# Patient Record
Sex: MALE | Race: WHITE | NOT HISPANIC OR LATINO | Employment: FULL TIME | URBAN - METROPOLITAN AREA
[De-identification: names, ages, dates, MRNs, and addresses within clinical notes are randomized per-mention and may not be internally consistent; named-entity substitution may affect disease eponyms.]

---

## 2021-02-11 DIAGNOSIS — Z23 ENCOUNTER FOR IMMUNIZATION: ICD-10-CM

## 2021-02-19 ENCOUNTER — IMMUNIZATIONS (OUTPATIENT)
Dept: FAMILY MEDICINE CLINIC | Facility: HOSPITAL | Age: 31
End: 2021-02-19

## 2021-02-19 DIAGNOSIS — Z23 ENCOUNTER FOR IMMUNIZATION: Primary | ICD-10-CM

## 2021-02-19 PROCEDURE — 91300 SARS-COV-2 / COVID-19 MRNA VACCINE (PFIZER-BIONTECH) 30 MCG: CPT

## 2021-02-19 PROCEDURE — 0001A SARS-COV-2 / COVID-19 MRNA VACCINE (PFIZER-BIONTECH) 30 MCG: CPT

## 2021-03-12 ENCOUNTER — IMMUNIZATIONS (OUTPATIENT)
Dept: FAMILY MEDICINE CLINIC | Facility: HOSPITAL | Age: 31
End: 2021-03-12

## 2021-03-12 DIAGNOSIS — Z23 ENCOUNTER FOR IMMUNIZATION: Primary | ICD-10-CM

## 2021-03-12 PROCEDURE — 91300 SARS-COV-2 / COVID-19 MRNA VACCINE (PFIZER-BIONTECH) 30 MCG: CPT

## 2021-03-12 PROCEDURE — 0002A SARS-COV-2 / COVID-19 MRNA VACCINE (PFIZER-BIONTECH) 30 MCG: CPT

## 2022-02-23 ENCOUNTER — OFFICE VISIT (OUTPATIENT)
Dept: FAMILY MEDICINE CLINIC | Facility: CLINIC | Age: 32
End: 2022-02-23
Payer: COMMERCIAL

## 2022-02-23 VITALS
RESPIRATION RATE: 17 BRPM | DIASTOLIC BLOOD PRESSURE: 64 MMHG | OXYGEN SATURATION: 98 % | BODY MASS INDEX: 20.13 KG/M2 | HEIGHT: 78 IN | SYSTOLIC BLOOD PRESSURE: 122 MMHG | HEART RATE: 84 BPM | WEIGHT: 174 LBS | TEMPERATURE: 98 F

## 2022-02-23 DIAGNOSIS — Z13.6 SCREENING FOR CARDIOVASCULAR CONDITION: ICD-10-CM

## 2022-02-23 DIAGNOSIS — F90.2 ATTENTION DEFICIT HYPERACTIVITY DISORDER (ADHD), COMBINED TYPE: ICD-10-CM

## 2022-02-23 DIAGNOSIS — F31.81 BIPOLAR 2 DISORDER (HCC): ICD-10-CM

## 2022-02-23 DIAGNOSIS — L30.8 OTHER ECZEMA: ICD-10-CM

## 2022-02-23 DIAGNOSIS — Z13.29 SCREENING FOR THYROID DISORDER: ICD-10-CM

## 2022-02-23 DIAGNOSIS — Z00.00 ANNUAL PHYSICAL EXAM: Primary | ICD-10-CM

## 2022-02-23 DIAGNOSIS — R53.83 FATIGUE, UNSPECIFIED TYPE: ICD-10-CM

## 2022-02-23 PROBLEM — L30.9 ECZEMA: Status: ACTIVE | Noted: 2022-02-23

## 2022-02-23 PROCEDURE — 99385 PREV VISIT NEW AGE 18-39: CPT | Performed by: FAMILY MEDICINE

## 2022-02-23 PROCEDURE — 99203 OFFICE O/P NEW LOW 30 MIN: CPT | Performed by: FAMILY MEDICINE

## 2022-02-23 NOTE — PROGRESS NOTES
Assessment/Plan:    Eczema  Patient has dermatitis likely eczema finger tips  Recently over the past several weeks  Advised patient continue with Eucerin or moisturizer lotion  Continue wearing gloves as needed  Try to avoid washing his hands often or if so patient needs to moisturize hands  Will refer to Dermatology  Follow-up as needed    Bipolar 2 disorder [facility])  Patient has a bipolar disorder type 2  Needs referral to behavioral health therapist   Will refer today  Also refer to psychiatrist as needed  No medication needed at this time  Advised patient that if he does feel like he needs medication any time, he should come to office or go to psychiatrist   Follow-up as needed    Attention deficit hyperactivity disorder (ADHD), combined type  Not currently on medications  Manage mostly with behavioral and lifestyle changes  Follow-up as needed       Diagnoses and all orders for this visit:    Annual physical exam    Other eczema  -     Ambulatory Referral to Dermatology; Future    Bipolar 2 disorder [facility])  -     Ambulatory Referral to Psychiatry; Future  -     Ambulatory Referral to [facility]; Future    Attention deficit hyperactivity disorder (ADHD), combined type  -     Ambulatory Referral to Psychiatry; Future  -     Ambulatory Referral to [facility]; Future    Screening for cardiovascular condition  Comments:  Ordered CMP and lipid panel  Orders:  -     Comprehensive metabolic panel; Future  -     Lipid panel; Future    Screening for thyroid disorder  Comments:  Order TSH  Orders:  -     TSH, 3rd generation with Free T4 reflex;  Future    Fatigue, unspecified type  Comments:  ordered CBC  Orders:  -     CBC and differential; Future          Subjective:   Chief Complaint   Patient presents with   [address] Maintenance   Topic Date Due    Hepatitis C Screening  Never done    HIV Screening  Never done    DTaP,Tdap,and Td Vaccines (1 - Tdap) Never done    COVID-19 Vaccine (3 - Booster for Pfizer series) 08/12/2021    Influenza Vaccine (1) Never done    Depression Screening  02/23/2023    BMI: Adult  02/23/2023    Annual Physical  02/23/2023    Pneumococcal Vaccine: Pediatrics (0 to 5 Years) and At-Risk Patients (6 to 59 Years)  Aged Out    HIB Vaccine  Aged Out    Hepatitis B Vaccine  Aged Out    IPV Vaccine  Aged Out    Hepatitis A Vaccine  Aged Out    Meningococcal ACWY Vaccine  Aged Out    HPV Vaccine  Aged Out        Patient ID: Betty Hall is a [id number] [address] y o  male  HPI    Rash occurring on all fingertips of both hands that started roughly 3 and half weeks ago  Itchy and dry  Has tried using moisturizing cream/Eucerin/Aquaphor eczema cream with cotton gloves  Improvement with this treatment  Has strong family history of eczema  No personal history in the past     On history of bipolar disorder type 2 and ADHD  Patient has history of both diagnosis  Diagnosis a child  Previously on different medications  Of note patient recalls being on Depakote for bipolar disorder  Patient also recalls being on Paxil and Ritalin  Off all these medications now  Has been managed with mostly behavioral changes in lifestyle changes  On counseling at this time  The following portions of the patient's history were reviewed and updated as appropriate: allergies, current medications, past family history, past medical history, past social history, past surgical history, and problem list     Review of Systems   Constitutional: Negative for chills and fever  HENT: Negative for congestion, sinus pressure, sinus pain and sore throat  Eyes: Negative for pain and visual disturbance  Respiratory: Negative for cough and shortness of breath  Cardiovascular: Negative for chest pain and palpitations  Gastrointestinal: Negative for abdominal pain, diarrhea, nausea and vomiting  Genitourinary: Negative for dysuria and hematuria  Musculoskeletal: Negative for myalgias  Skin: Positive for rash (Rash on finger tips)  Neurological: Negative for dizziness and headaches  Objective:  /64 (BP Location: Left arm, Patient Position: Sitting, Cuff Size: Adult)   Pulse 84   Temp 98 °F (36 7 °C) (Tympanic)   Resp 17   Ht 6' 6" (1 981 m)   Wt 78 9 kg (174 lb)   SpO2 98%   BMI 20 11 kg/m²      Physical Exam  Vitals and nursing note reviewed  Constitutional:       General: He is not in acute distress  HENT:      Head: Normocephalic and atraumatic  Eyes:      Conjunctiva/sclera: Conjunctivae normal    Cardiovascular:      Rate and Rhythm: Normal rate and regular rhythm  Pulses: Normal pulses  Heart sounds: No murmur heard  Pulmonary:      Effort: Pulmonary effort is normal  No respiratory distress  Breath sounds: No wheezing, rhonchi or rales  Musculoskeletal:      Comments: Fingertips on both hands were dry  Cracked  No tenderness  Lymphadenopathy:      Cervical: No cervical adenopathy  Neurological:      Mental Status: He is alert  Psychiatric:         Mood and Affect: Mood normal                This note has been constructed using a voice recognition system  There may be translation, syntax, or grammatical errors  If you have an questions, please contact the dictating provider

## 2022-02-23 NOTE — PROGRESS NOTES
[facility]    NAME: Codi Old  AGE: 28 y o  SEX: male  : 1990     DATE: 2022     Assessment and Plan:     Problem List Items Addressed This Visit        Musculoskeletal and Integument    Eczema    Relevant Orders    Ambulatory Referral to Dermatology       Other    Bipolar 2 disorder [facility])    Relevant Orders    Ambulatory Referral to Psychiatry    Ambulatory Referral to [facility]    Attention deficit hyperactivity disorder (ADHD), combined type    Relevant Orders    Ambulatory Referral to Psychiatry    Ambulatory Referral to [facility]      Other Visit Diagnoses     Annual physical exam    -  Primary    Screening for cardiovascular condition        Relevant Orders    Comprehensive metabolic panel    Lipid panel    Screening for thyroid disorder        Relevant Orders    TSH, 3rd generation with Free T4 reflex    Fatigue, unspecified type        Relevant Orders    CBC and differential        Patient presents for a well exam today  All past medical history, family history, medications, allergies, social history and problem list updated  All pertinent previous, labs, imaging and records reviewed  Advised patient to see dentist and optometrist at least once a year  Preventative screens negative  Follow up in 1 year for annual well or sooner for any concerns    HM:  Immunizations up-to-date, other than the following:  Patient believes that he is up-to-date with all of his vaccinations  Did get COVID shots x3  Will sign authorization form to get previous immunization records  Immunizations and preventive care screenings were discussed with patient today  Appropriate education was printed on patient's after visit summary      Counseling:  Alcohol/drug use: discussed moderation in alcohol intake, the recommendations for healthy alcohol use, and avoidance of illicit drug use   Dental Health: discussed importance of regular tooth brushing, flossing, and dental visits  Injury prevention: discussed safety/seat belts, safety helmets, smoke detectors, carbon dioxide detectors, and smoking near bedding or upholstery  Sexual health: discussed sexually transmitted diseases, partner selection, use of condoms, avoidance of unintended pregnancy, and contraceptive alternatives  · Exercise: the importance of regular exercise/physical activity was discussed  Recommend exercise 3-5 times per week for at least 30 minutes  Return in about 1 year (around 2/23/2023) for Annual physical      Chief Complaint:     Chief Complaint   Patient presents with    [facility]      History of Present Illness:     Adult Annual Physical   Patient here for a comprehensive physical exam  The patient reports problems - see other note  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    [employer]    Sexual active  No protection  No smoking,  Rarely drinks, No RD    Diet and Physical Activity  · Diet/Nutrition: well balanced diet  · Exercise: walking  Depression Screening  PHQ-2/9 Depression Screening    Little interest or pleasure in doing things: 0 - not at all  Feeling down, depressed, or hopeless: 0 - not at all  PHQ-2 Score: 0  PHQ-2 Interpretation: Negative depression screen       General Health  · Sleep: sleeps well  · Hearing: normal - bilateral   · Vision: no vision problems  · Dental: regular dental visits   Health  · History of STDs?: yes  Chlamydia treated  Review of Systems:     Review of Systems   Constitutional: Negative for chills and fever  HENT: Negative for congestion, sinus pressure, sinus pain and sore throat  Eyes: Negative for pain and visual disturbance  Respiratory: Negative for cough and shortness of breath  Cardiovascular: Negative for chest pain and palpitations     Gastrointestinal: Negative for abdominal pain, diarrhea, nausea and vomiting  Genitourinary: Negative for dysuria and hematuria  Musculoskeletal: Negative for myalgias  Skin: Positive for rash (Rash on finger tips  )  Neurological: Negative for dizziness and headaches  Past Medical History:     History reviewed  No pertinent past medical history  Past Surgical History:     History reviewed  No pertinent surgical history  Social History:     Social History     Socioeconomic History    Marital status: Single     Spouse name: None    Number of children: None    Years of education: None    Highest education level: None   Occupational History    None   Tobacco Use    Smoking status: Never Smoker    Smokeless tobacco: Never Used   Substance and Sexual Activity    Alcohol use: Yes     Comment: socially    Drug use: Not Currently    Sexual activity: Yes   Other Topics Concern    None   Social History Narrative    None     Social Determinants of Health     Financial Resource Strain: Not on file   Food Insecurity: Not on file   Transportation Needs: Not on file   Physical Activity: Not on file   Stress: Not on file   Social Connections: Not on file   Intimate Partner Violence: Not on file   Housing Stability: Not on file      Family History:     History reviewed  No pertinent family history  Current Medications:     No current outpatient medications on file  No current facility-administered medications for this visit  Allergies:     No Known Allergies   Physical Exam:     /64 (BP Location: Left arm, Patient Position: Sitting, Cuff Size: Adult)   Pulse 84   Temp 98 °F (36 7 °C) (Tympanic)   Resp 17   Ht 6' 6" (1 981 m)   Wt 78 9 kg (174 lb)   SpO2 98%   BMI 20 11 kg/m²     Physical Exam  Vitals and nursing note reviewed  Constitutional:       General: He is not in acute distress  Appearance: He is well-developed  HENT:      Head: Normocephalic and atraumatic        Right Ear: Tympanic membrane normal       Left Ear: Tympanic membrane normal       Nose: Nose normal       Mouth/Throat:      Mouth: Mucous membranes are moist       Pharynx: Oropharynx is clear  Eyes:      Conjunctiva/sclera: Conjunctivae normal    Cardiovascular:      Rate and Rhythm: Normal rate and regular rhythm  Pulses: Normal pulses  Heart sounds: No murmur heard  Pulmonary:      Effort: Pulmonary effort is normal  No respiratory distress  Breath sounds: Normal breath sounds  Abdominal:      Palpations: Abdomen is soft  Tenderness: There is no abdominal tenderness  Musculoskeletal:         General: Normal range of motion  Cervical back: Neck supple  Comments: Fingertips on both hands were dry  Not cracked  No other secondary overlying skin changes noted  Lymphadenopathy:      Cervical: No cervical adenopathy  Skin:     General: Skin is warm and dry  Capillary Refill: Capillary refill takes less than 2 seconds  Neurological:      Mental Status: He is alert  Psychiatric:         Mood and Affect: Mood normal         This note has been constructed using a voice recognition system  There may be translation, syntax, or grammatical errors  If you have an questions, please contact the dictating provider        Safia Torres [phone]

## 2022-02-23 NOTE — ASSESSMENT & PLAN NOTE
Patient has dermatitis likely eczema finger tips  Recently over the past several weeks  Advised patient continue with Eucerin or moisturizer lotion  Continue wearing gloves as needed  Try to avoid washing his hands often or if so patient needs to moisturize hands  Will refer to Dermatology      Follow-up as needed

## 2022-02-23 NOTE — PATIENT INSTRUCTIONS
Wellness Visit for Adults   WHAT YOU NEED TO KNOW:   What is a wellness visit? A wellness visit is when you see your healthcare provider to get screened for health problems  Your healthcare provider will also give you advice on how to stay healthy  Write down your questions so you remember to ask them  Ask your healthcare provider how often you should have a wellness visit  What happens at a wellness visit? Your healthcare provider will ask about your health, and your family history of health problems  This includes high blood pressure, heart disease, and cancer  He or she will ask if you have symptoms that concern you, if you smoke, and about your mood  You may also be asked about your intake of medicines, supplements, food, and alcohol  Any of the following may be done:  · Your weight  will be checked  Your height may also be checked so your body mass index (BMI) can be calculated  Your BMI shows if you are at a healthy weight  · Your blood pressure  and heart rate will be checked  Your temperature may also be checked  · Blood and urine tests  may be done  Blood tests may be done to check your cholesterol levels  Abnormal cholesterol levels increase your risk for heart disease and stroke  You may also need a blood or urine test to check for diabetes if you are at increased risk  Urine tests may be done to look for signs of an infection or kidney disease  · A physical exam  includes checking your heartbeat and lungs with a stethoscope  Your healthcare provider may also check your skin to look for sun damage  · Screening tests  may be recommended  A screening test is done to check for diseases that may not cause symptoms  The screening tests you may need depend on your age, gender, family history, and lifestyle habits  For example, colorectal screening may be recommended if you are 48years old or older  What screening tests do I need if I am a woman?    · A Pap smear  is used to screen for cervical cancer  Pap smears are usually done every 3 to 5 years depending on your age  You may need them more often if you have had abnormal Pap smear test results in the past  Ask your healthcare provider how often you should have a Pap smear  · A mammogram  is an x-ray of your breasts to screen for breast cancer  Experts recommend mammograms every 2 years starting at age 48 years  You may need a mammogram at age 52 years or younger if you have an increased risk for breast cancer  Talk to your healthcare provider about when you should start having mammograms and how often you need them  What vaccines might I need? · Get an influenza vaccine  every year  The influenza vaccine protects you from the flu  Several types of viruses cause the flu  The viruses change over time, so new vaccines are made each year  · Get a tetanus-diphtheria (Td) booster vaccine  every 10 years  This vaccine protects you against tetanus and diphtheria  Tetanus is a severe infection that may cause painful muscle spasms and lockjaw  Diphtheria is a severe bacterial infection that causes a thick covering in the back of your mouth and throat  · Get a human papillomavirus (HPV) vaccine  if you are female and aged 23 to 32 or male 23 to 24 and never received it  This vaccine protects you from HPV infection  HPV is the most common infection spread by sexual contact  HPV may also cause vaginal, penile, and anal cancers  · Get a pneumococcal vaccine  if you are aged 72 years or older  The pneumococcal vaccine is an injection given to protect you from pneumococcal disease  Pneumococcal disease is an infection caused by pneumococcal bacteria  The infection may cause pneumonia, meningitis, or an ear infection  · Get a shingles vaccine  if you are 60 or older, even if you have had shingles before  The shingles vaccine is an injection to protect you from the varicella-zoster virus  This is the same virus that causes chickenpox   Shingles is a painful rash that develops in people who had chickenpox or have been exposed to the virus  How can I eat healthy? My Plate is a model for planning healthy meals  It shows the types and amounts of foods that should go on your plate  Fruits and vegetables make up about half of your plate, and grains and protein make up the other half  A serving of dairy is included on the side of your plate  The amount of calories and serving sizes you need depends on your age, gender, weight, and height  Examples of healthy foods are listed below:  · Eat a variety of vegetables  such as dark green, red, and orange vegetables  You can also include canned vegetables low in sodium (salt) and frozen vegetables without added butter or sauces  · Eat a variety of fresh fruits , canned fruit in 100% juice, frozen fruit, and dried fruit  · Include whole grains  At least half of the grains you eat should be whole grains  Examples include whole-wheat bread, wheat pasta, brown rice, and whole-grain cereals such as oatmeal     · Eat a variety of protein foods such as seafood (fish and shellfish), lean meat, and poultry without skin (turkey and chicken)  Examples of lean meats include pork leg, shoulder, or tenderloin, and beef round, sirloin, tenderloin, and extra lean ground beef  Other protein foods include eggs and egg substitutes, beans, peas, soy products, nuts, and seeds  · Choose low-fat dairy products such as skim or 1% milk or low-fat yogurt, cheese, and cottage cheese  · Limit unhealthy fats  such as butter, hard margarine, and shortening  How much exercise do I need? Exercise at least 30 minutes per day on most days of the week  Some examples of exercise include walking, biking, dancing, and swimming  You can also fit in more physical activity by taking the stairs instead of the elevator or parking farther away from stores  Include muscle strengthening activities 2 days each week   Regular exercise provides many health benefits  It helps you manage your weight, and decreases your risk for type 2 diabetes, heart disease, stroke, and high blood pressure  Exercise can also help improve your mood  Ask your healthcare provider about the best exercise plan for you  What are some general health and safety guidelines I should follow? · Do not smoke  Nicotine and other chemicals in cigarettes and cigars can cause lung damage  Ask your healthcare provider for information if you currently smoke and need help to quit  E-cigarettes or smokeless tobacco still contain nicotine  Talk to your healthcare provider before you use these products  · Limit alcohol  A drink of alcohol is 12 ounces of beer, 5 ounces of wine, or 1½ ounces of liquor  · Lose weight, if needed  Being overweight increases your risk of certain health conditions  These include heart disease, high blood pressure, type 2 diabetes, and certain types of cancer  · Protect your skin  Do not sunbathe or use tanning beds  Use sunscreen with a SPF 15 or higher  Apply sunscreen at least 15 minutes before you go outside  Reapply sunscreen every 2 hours  Wear protective clothing, hats, and sunglasses when you are outside  · Drive safely  Always wear your seatbelt  Make sure everyone in your car wears a seatbelt  A seatbelt can save your life if you are in an accident  Do not use your cell phone when you are driving  This could distract you and cause an accident  Pull over if you need to make a call or send a text message  · Practice safe sex  Use latex condoms if are sexually active and have more than one partner  Your healthcare provider may recommend screening tests for sexually transmitted infections (STIs)  · Wear helmets, lifejackets, and protective gear  Always wear a helmet when you ride a bike or motorcycle, go skiing, or play sports that could cause a head injury  Wear protective equipment when you play sports   Wear a lifejacket when you are on a boat or doing water sports  CARE AGREEMENT:   You have the right to help plan your care  Learn about your health condition and how it may be treated  Discuss treatment options with your healthcare providers to decide what care you want to receive  You always have the right to refuse treatment  The above information is an  only  It is not intended as medical advice for individual conditions or treatments  Talk to your doctor, nurse or pharmacist before following any medical regimen to see if it is safe and effective for you  © Copyright [employer] 2021 Information is for End User's use only and may not be sold, redistributed or otherwise used for commercial purposes   All illustrations and images included in CareNotes® are the copyrighted property of A D A M , Inc  or [address] [place] [address]

## 2022-02-23 NOTE — ASSESSMENT & PLAN NOTE
Patient has a bipolar disorder type 2  Needs referral to behavioral health therapist   Will refer today  Also refer to psychiatrist as needed  No medication needed at this time      Advised patient that if he does feel like he needs medication any time, he should come to office or go to psychiatrist   Follow-up as needed

## 2022-03-22 ENCOUNTER — TELEPHONE (OUTPATIENT)
Dept: PSYCHIATRY | Facility: CLINIC | Age: 32
End: 2022-03-22

## 2022-03-29 ENCOUNTER — TELEPHONE (OUTPATIENT)
Dept: PSYCHIATRY | Facility: CLINIC | Age: 32
End: 2022-03-29

## 2022-03-31 ENCOUNTER — SOCIAL WORK (OUTPATIENT)
Dept: BEHAVIORAL/MENTAL HEALTH CLINIC | Facility: CLINIC | Age: 32
End: 2022-03-31
Payer: COMMERCIAL

## 2022-03-31 DIAGNOSIS — F31.81 BIPOLAR 2 DISORDER (HCC): Primary | ICD-10-CM

## 2022-03-31 PROCEDURE — 90791 PSYCH DIAGNOSTIC EVALUATION: CPT | Performed by: SOCIAL WORKER
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
## 2022-05-13 ENCOUNTER — SOCIAL WORK (OUTPATIENT)
Dept: BEHAVIORAL/MENTAL HEALTH CLINIC | Facility: CLINIC | Age: 32
End: 2022-05-13
Payer: COMMERCIAL

## 2022-05-13 DIAGNOSIS — F31.81 BIPOLAR 2 DISORDER (HCC): Primary | ICD-10-CM

## 2022-05-13 PROCEDURE — 90834 PSYTX W PT 45 MINUTES: CPT | Performed by: SOCIAL WORKER

## 2022-05-13 NOTE — PSYCH
Psychotherapy Provided: Individual Psychotherapy 45 minutes     Length of time in session: 45 minutes, follow up in 4 weeks time    Goals addressed in session: Goal 1 Pt reports he is feeling on the depressed side of things this morning  Pt reports he goes through fairly regular cycles of being more manic and high energy for several days, followed by a few more depressed days  Pt states he has been in this cycle for a long time  Depressed days have been elevated this past two weeks  Pt reports he has learnt to be consistent during these times, and take care of the essentials  Pt reports he tries to get things done when he has elevated energy  Pt reports this affords him the opportunity to do less when he is feeling down  Pt reports he has learnt this the hard way, and is working on managing and working with his overall mental health symptoms  Pt reports he feels like he has a fairly narrow bandwidth from which he does well  Pt states staying in this healthy place has been historically difficult for him  Pt reports he has got used to being OK with some of the down and depressed days, and not pressing the self destruct button or self medicating  Pt has gotten used to being uncomfortable and not being too introspective during these times in his life  Pt reports he is trying to work on communicating this to his girlfriend and people around him  Pt states often he just has to articulate that he is having a moment where he does not feel good enough, or he feels slightly paranoid about how people feel about himself  Pt states in these times he does not always need to give him words of affirmation, but saying it out load allow him to process it and start to access information which contradicts the negative emotions he is feeling  Pt reports he is wanting to improve the way he explains this to people  Pt reports he is also working on self care during these depressive episodes   Pt reports he has done a fairly good job during this depressive episode, but he does feel the lack of motivation and the tendency to want to avoid, hide away and being more socially withdrawn  Pt also states he struggles to make his own needs known as he feels he does not want to be a bother  Pt reports he is trying to work on this, and allow people into his mind and emotions and get the support he longs for  Pt is committed to continue to open up to others, identify who are the important people in his life, and ensure the important people are closest to him, and that the unimportant people are less close to him  Pt states managing his relationships are how close they are also impact pt staying in the good zone where he can manage his bipolar depression a lot better  Pain:      none    0    Current suicide risk : Low   Pt is future oriented and not suicidal      Behavioral Health Treatment Plan  [facility]: Diagnosis and Treatment Plan explained to Christen Monroe relates understanding diagnosis and is agreeable to Treatment Plan   Yes

## 2022-06-10 ENCOUNTER — SOCIAL WORK (OUTPATIENT)
Dept: BEHAVIORAL/MENTAL HEALTH CLINIC | Facility: CLINIC | Age: 32
End: 2022-06-10
Payer: COMMERCIAL

## 2022-06-10 DIAGNOSIS — F31.81 BIPOLAR 2 DISORDER (HCC): Primary | ICD-10-CM

## 2022-06-10 PROCEDURE — 90834 PSYTX W PT 45 MINUTES: CPT | Performed by: SOCIAL WORKER

## 2022-06-10 NOTE — PSYCH
Psychotherapy Provided: Individual Psychotherapy 45 minutes     Length of time in session: 45 minutes, follow up in 3 weeks  Goals addressed in session: Goal 1     Pt reports he has been running moderate levels of depressed mood over the last few days  Pt reports this morning, his motivated and energy are diminished  Pt was late for this appointment, and when he is depressed he struggles to be on time  Pt reports he had to force himself to get out of the house to get to this appointment  Pt states he has been very busy, and when work is busy, he does not have much left in the tank to take care of himself, and other pressing matters  Pt reports he knows he needs to have a low key few days to get some rest, and recuperate  Pt reports his work takes him places, and 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 which was demanding  Pt reports he also had several events this past two weeks which caused him to spiral  Pt reports they are often simple things which he is not good at processing  Pt reports his girlfriend is much better when it comes to technology, and she went through the explanation so fast that pt was not able to follow  Rather than pt asking her to explain it again, pt went into his own dark emotional world and gave up, and pt reports he started to spiral with negative thoughts, confirming thoughts that he is no good, stupid, and does not deserve the successes he is having in life  Pt states he can easily spiral in these situations  Pt no longer gets to the point of self harm or high risk behavaior, but he does tend to become very negative introspective and dark  This writer and pt discussed strategies to avoid this spiraling  Pt and this writer discussed ways to avoid looking for supportive evidence for reasons why pt should hate himself and feel sorry for himself  Instead, pt is asked to consider ways to find evidence that disproves the way pt is feeling about himself   Pt feels this could be a good solution, if pt is able to muster up the courage to do this when he is feeling worthless  This Mildred Doe suggests pt could allow loved ones to hold him accountable for this  Ultimately it is like the process of working an underused muscle  The process is very hard initially, but it can get easier with use  Pt is encouraged to find ways to do this  Even if pt can only do this 50% of the time, it could go a long way to reducing the duration and intensity of depressive episodes  Pt to follow up with this writer in 3 weeks time  Pain:      none    0    Current suicide risk : Low     Pt is future oriented and not suicidal      Behavioral Health Treatment Plan  [facility]: Diagnosis and Treatment Plan explained to Kenji Garland relates understanding diagnosis and is agreeable to Treatment Plan   Yes

## 2022-07-01 ENCOUNTER — SOCIAL WORK (OUTPATIENT)
Dept: BEHAVIORAL/MENTAL HEALTH CLINIC | Facility: CLINIC | Age: 32
End: 2022-07-01
Payer: COMMERCIAL

## 2022-07-01 DIAGNOSIS — F31.81 BIPOLAR 2 DISORDER (HCC): Primary | ICD-10-CM

## 2022-07-01 PROCEDURE — 90834 PSYTX W PT 45 MINUTES: CPT | Performed by: SOCIAL WORKER

## 2022-07-01 NOTE — PSYCH
1  B[facility] 2 [facility] ([facility] )       Data: Pt comes to this session, tired, and a little burnt out and on edge  Pt reports working a very grueling two weeks since last session, with little down time, and pt reports he is tired and pretty spent  Pt reports mood has been ok  No major hypomania, and not generally euthymic mood  Pt reports he knows he needs to get some rest and improved sleep over the next few days, and he plans to take off work until next week  Pt reports he did not want to go into details about some of the intrusive thoughts he has been experiencing over the last few days, as he did not feel up for this conversation today, but next time  Pt reports he feels he is managing his Bipolar 2 well currently, with nothing to be overly concerned about  Pt reports he is getting a lot well with his girlfriend, work is taxing and difficult at time, but manageable  Pt reports the only major concern he has had this past two weeks is with feeling bad when he has had to cancel some extra curricular activities through being tired  Pt has a pretty exagerated guilt response, and wants to save the world  Pt says he related this to his life growing up where he was rewarded for saying yes, and told he was smart and great at everything  Pt reports this has caused him some problems in adulthood, as he often does not feel smart, and he finds it difficult to say no  Pt reports he is trying to get out of this perfectionism which he battles with and which can impact his mood and depressive symptoms when he can't deliver or achieve what he feels like he should achieve  Pt and this writer talked about his ability to say no and how the grief process can often be applied to situations where he has to say no, but he struggles with the process  Pt wants to work on getting to the accepting phase much quicker and not beating himself up for saying no, and turning introspective and persecutory about having to say no       Pt reports he is also recognizing the ebb and flow of his mood, and his cycle between hypomanic and depressed  Pt reports he is learning to continue to manage this well  Pt recognizes that he really must rest for a couple of days and have low stimulation to his brain and body can recover from a busy time period  When asked what pt can do about this, pt reports he would like to look at doing some hiking, playing video games, and listening to a new album from his favorite artist  Pt reports he gets pleasure out of this  Pt also states he wants to be somewhat productive, and he plans to get something done in the morning, so he can build up his confidence and self worth even on his days off, where he is typically less productive  Assessment: pt is doing OK  Managing his bipolar 2 well  No major causes for concern at this session  Pt is just reminded to continue with good boundaries,a dn to avoid beating himself up when he can't achieve everything he wants to achieve  Pt is encouraged to be kind to self, and continue to remind himself of the things he does well, rather than the things he struggles with  Pt reports he feels he is doing well with these things, and working with his self care strategies and great insight to manage his mental health well  Plan: Follow up in 2 weeks time  Psychotherapy Provided: Individual Psychotherapy 45 minutes     Length of time in session: 45 minutes, follow up in 3 weeks time  Goals addressed in session: Goal 1     Pain:      none    0    Current suicide risk : Low     Pt is future oriented and not suicidal      Behavioral Health Treatment Plan [facility]: Diagnosis and Treatment Plan explained to Farhana Michel relates understanding diagnosis and is agreeable to Treatment Plan   Yes

## 2022-07-22 ENCOUNTER — SOCIAL WORK (OUTPATIENT)
Dept: BEHAVIORAL/MENTAL HEALTH CLINIC | Facility: CLINIC | Age: 32
End: 2022-07-22
Payer: COMMERCIAL

## 2022-07-22 DIAGNOSIS — F31.81 BIPOLAR 2 DISORDER (HCC): Primary | ICD-10-CM

## 2022-07-22 PROCEDURE — 90834 PSYTX W PT 45 MINUTES: CPT | Performed by: SOCIAL WORKER

## 2022-07-22 NOTE — BH TREATMENT PLAN
Joe Miller  1990         Date of Initial Treatment Plan: 3/31/2022  Date of Current Treatment Plan: 04/07/22     Treatment Plan Number 1     Strengths/Personal Resources for Self Care: Pt has good insight and is a very good communicator       Diagnosis:   1  Bipolar 2 disorder ([facility] )            Area of Needs: Pt has struggles with depressive episodes and also impulsive behaviors at times  Pt struggles to stick to well established coping strategies at times, and can avoid processing stressors in healthy ways  Pt benefits from being in regular therapy as a preventive way to manage his diagnosis of Bipolar 2 disorder        Long Term Goal 1: Reduce depression       Target Date: N/A  Completion Date: N/A         Short Term Objectives for Goal 1: Work to improve distress tolerance related to prior trauma  Work on emotional regulation  Work to improve practice of self care, insight, and self awareness to improve wellness and manage depressive symptoms             GOAL 1: Modality: Individual 2x per month   Completion Date 9/25/2022 Pt will work with Margo Rodriguez LCSW in this work   Treatment modality will be CBT, Brief Therapy and Solution Focused therapy
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
## 2022-08-05 ENCOUNTER — SOCIAL WORK (OUTPATIENT)
Dept: BEHAVIORAL/MENTAL HEALTH CLINIC | Facility: CLINIC | Age: 32
End: 2022-08-05
Payer: COMMERCIAL

## 2022-08-05 DIAGNOSIS — F31.81 BIPOLAR 2 DISORDER (HCC): Primary | ICD-10-CM

## 2022-08-05 PROCEDURE — 90834 PSYTX W PT 45 MINUTES: CPT | Performed by: SOCIAL WORKER

## 2022-08-05 NOTE — PSYCH
1  Bipolar 2 disorder ([facility] )       Data: Pt reports he is not doing so well today  Pt seems more withdrawn, sad, with flatter affect  Pt reports he just feels down, and depressed, lack of motivation, difficulty getting out of bed, hard to stay focused, niggles and arguments with his girlfriend  Pt reports he has been feeling this way for the last 3 or so days  Pt reports when he goes through depressive episodes they tend to last about 2 weeks  Pt reports sometimes he does not have specific triggers for the depressive episodes  In talking about the run up to this particular depressive episode, pt seems a little burnt out  Pt admits he has been running on empty for some time  Pt has a tendency to over compensate in relationships and in the work place  Pt reports he takes pride in doing a good job, and pt feels his colleagues and boss is starting to see this, and they are putting more work on him, more projects and more traveling time away from this area  Pt reports he feels like he has too many balls in the air, and this feeling of not knowing how to get everything done, and feeling overwhelmed, causes him to feel like he is failing, and he is not good enough  Pt reports these are familiar feelings, which stem from childhood, which contributes to pt feeling not good enough, failing, inadequate  Pt and this writer discussed strategies that work for him when he is depressed  Pt reports he tries his best to get up at the same time  Pt does reports he recently started working out, and he spent two days at the gym already this week, which has been helpful  Pt feeling a little sore today  Pt reports he tends to freeze up and communication suffers with people around him  pt admits he tends to not vocalize when he is struggling  Pt and this writer discussed building bridges to the people around him, rather than internalizing everything   This writer and pt discussed some of the thought traps related to thinking we will know how people will react, or having fears about how people will react and operating in avoidance mode, while at the same time taking on everything ourselves and not letting people know we are struggling  Pt and this writer discussed some co-dependent behaviors which will be discussed in more detail in next session  Assessment: Clearly pt is going through a depressive episode  Pt manages these episodes pretty well  Pt and this writer discussed key of continuing communication with his girlfriend during this time, and taking care of the essentials  Plan: Follow up in 2 weeks time  Psychotherapy Provided: Individual Psychotherapy 45 minutes     Length of time in session: 45 minutes, follow up in 2 weeks    Goals addressed in session: Goal 1     Pain:      none    0    Current suicide risk : Low     Pt is future oriented and not suicidal        Behavioral Health Treatment Plan [facility]: Diagnosis and Treatment Plan explained to Aydin Quintana relates understanding diagnosis and is agreeable to Treatment Plan   Yes

## 2022-08-19 ENCOUNTER — SOCIAL WORK (OUTPATIENT)
Dept: BEHAVIORAL/MENTAL HEALTH CLINIC | Facility: CLINIC | Age: 32
End: 2022-08-19
Payer: COMMERCIAL

## 2022-08-19 DIAGNOSIS — F31.81 BIPOLAR 2 DISORDER (HCC): Primary | ICD-10-CM

## 2022-08-19 PROCEDURE — 90834 PSYTX W PT 45 MINUTES: CPT | Performed by: SOCIAL WORKER

## 2022-08-29 NOTE — PSYCH
1  Bipolar 2 disorder ([facility] )           Data: Pt presents to this session appearing tired, struggling for motivation, quieter than normal  Pt reports he still feels depressed, and recognizes this has been a longer than normal depressive episode for him  This writer and pt discussed what is happening in pt's current life, and pt admits his work has become a stressor for him  Pt admits he is overworking, and trying to juggle too many balls at the same time  Pt struggles to say no, and equates his self worth to pleasing people at times  Pt agrees he needs to have better boundaries in his life and working on communicating to his manager is the first step to rectifying the situation  Pt reports his manager is on his side, and he just needs to initiate this conversation as soon as possible  Pt also reports because his job is taking so much of his time, he has also been neglecting his self care, which is problematic for him when managing his mood  Pt has not been following up with his goal of gym use, having consistent sleep schedule, maintaining time away to do things he loves to do  Pt and this writer talked about what pt is passionate about, and what percentage of time he is allotting to his passions  Pt reports he knows he is mis allocating his time, and he just needs to do something about this  Due to pt's investment in his colleagues, he is hopeful he will be able to have a very good outcome when talking to his manager  Assessment: This writer feels strongly that pt is burnt out  Pt does have a two week vacation planned for end of august, which this writer feels it coming at the perfect time  Plan: Pt is going to arrange to have a meeting with his boss, and work out ways to make his job more workable  Vacation coming up, which is much needed  Follow up with this writer in 3 weeks time         Psychotherapy Provided: Individual Psychotherapy 45 minutes     Length of time in session: 45 minutes, follow up in 3 weeks time  Goals addressed in session: Goal 1     Pain:      none    0    Current suicide risk : Low     Pt is future oriented and not suicidal        Behavioral Health Treatment Plan [facility]: Diagnosis and Treatment Plan explained to Madelin Dc relates understanding diagnosis and is agreeable to Treatment Plan   Yes

## 2022-10-07 ENCOUNTER — SOCIAL WORK (OUTPATIENT)
Dept: BEHAVIORAL/MENTAL HEALTH CLINIC | Facility: CLINIC | Age: 32
End: 2022-10-07
Payer: COMMERCIAL

## 2022-10-07 DIAGNOSIS — F31.81 BIPOLAR 2 DISORDER (HCC): Primary | ICD-10-CM

## 2022-10-07 PROCEDURE — 90834 PSYTX W PT 45 MINUTES: CPT | Performed by: SOCIAL WORKER

## 2022-10-11 NOTE — PSYCH
1  Bipolar 2 disorder ([facility] )       Data: Pt reports he just got back from his trip to [place]  Pt reports the time away was a little stressful at times  Pt reports he got into some verbal altercations with his girlfriend and some hostile arguments at times  And pt would like to discuss this in this session  Pt reports he comes across as fairly mild mannered, but he has a temper on him  Pt reports he is not sure if this is connected with the bipolar disorder, or whether it is something else which awakens the anger  This writer and pt discussed the specifics of the argument with girlfriend  It is apparent, that the source of the argument came from pt feeling like he was not adequately supporting his girlfriend, who felt like she was being pushed to the side in favor of pt's family  Pt felt angry and hurt by this accusation, and was triggered when his girlfriend rolled her eyes at him, and accused him of things he did not feel like he was doing  Pt reports as he was triggered he felt the anger rise, and he started to feel the adrenaline kicking in  At that point, where knows he should have walked away, but he chose to escalate it and say some unkind words, which provoked his girlfriend to respond an escalate the situation back  Pt reports he has a difficult time controlling himself in these situations, and he does not want to repeat the mistakes of the past, when he has got into trouble with anger issues  Pt and this writer identifies pt's triggers, which always center on pt not being good enough, not feeling good enough, people disparaging him, his motivation and not taking his word seriously  Pt reports he ran into this during his childhood, which is why he thinks these areas or places where he gets easily triggered  Pt and this writer talked about the fight to defend dignity, and the feeling that we have to defend our reputation from accusations that are not true   Pt is encouraged to be super mindful of this process, and to recognize the early warning of possible triggers  Pt is encouraged to walk away, and stay in control, and only re-engage when pt feels calm  Pt is encourage to not accuse, but to talk about how the situation is making him feel  Pt and this writer also discussed the disappointment pt feels in himself  Sometimes his girlfriend is correct, and even if unintentional, pt sometimes neglects his girlfriend  When pt recognizes this he feels terrible about himself, which leads to self pity, self loathing, and pt wanting to [facility] for his mistakes  However this frustrated girlfriend as all she needs is a simple apology and a change of behavior  Pt finds this difficult and feels like he has to do something to make it up to her, or to stay down on himself so he feels she understands the remorse he is going through  Again, this also goes back to mindfulness, and good communication  Pt is encouraged to follow a pattern of what he knows his girlfriend needs in this situation, and a pattern which will allow pt a quick recovery from mistakes, and a recovery which avoids slipping into a depressive mood  Pt is encouraged to come up with a plan for these situations    Assessment: pt is doing well, good insight, wants to improve and be more successful with his relationship  Pt foundations a persecutory to self, and low self confidence  This explains why pt has a hard time making mistakes, not feeling good enough, and why he has a hard time bouncing back from frustrations  Pt is encouraged to have a crisis plan, and to follow this, communication is key! Plan: Follow up in 2 weeks time  Psychotherapy Provided: Individual Psychotherapy 45 minutes     Length of time in session: 45 minutes from 12:06 to 12:51, follow up in 2 weeks time       Goals addressed in session: Goal 1     Pain:      none    0    Current suicide risk : Low     Pt is future oriented and not suicidal        Behavioral Health Treatment Plan [facility]: Diagnosis and Treatment Plan explained to Sim Marcelo relates understanding diagnosis and is agreeable to Treatment Plan   Yes

## 2022-11-11 ENCOUNTER — SOCIAL WORK (OUTPATIENT)
Dept: BEHAVIORAL/MENTAL HEALTH CLINIC | Facility: CLINIC | Age: 32
End: 2022-11-11

## 2022-11-11 DIAGNOSIS — F31.81 BIPOLAR 2 DISORDER (HCC): Primary | ICD-10-CM

## 2022-11-14 NOTE — PSYCH
1  Bipolar 2 disorder ([facility] )       Data: Pt reports more protracted episode of depression  Pt feels the depression over the last several weeks has been harder to get through  Pt feels like he is wading through thick water, and not getting the kind of sonja or pleasure in his life he can often find  Pt reports his motivation is down  Pt is less interested in his work, sleeping more, and feeling mentally exhausted  Pt reports he is more irritable  Pt reports sleep is also much reduced  Sleep has always been an issue, but it is becoming more concerning, as pt does not wake up feeling rested  This writer and pt discussed possible referral to Psychiatry to consider re-starting medicine  Pt has been reluctant to go back to medicine as he has often felt like he gets on more and more medicine and does not get the desired benefits he is looking for  Pt is agreeable to a psych evaluation and this writer recommends pt go through his PCP  Pt and this writer discussed the poor motivation during this session  Pt is encouraged to try and bring people into his life who can help bring more stability, incentive and reward for pt doing what he needs to do to get through this depressive episode  Pt is also encouraged to try and write down who the people are who can offer him support and encouragement  Pt and this writer also discussed the role meditation, and regular breaks can have in charging the battery of brains in people who have executive functioning impairment  Assessment: Pt is struggling with depression currently  Psychiatric eval might be helpful  Pt considering going back to medicine  Pt still showing good insight  Plan: follow up in 2 weeks time  Psychotherapy Provided: Individual Psychotherapy 45 minutes     Length of time in session: 45 minutes, follow up in 2 weeks time       Goals addressed in session: Goal 1     Pain:      none    0    Current suicide risk : Low     Pt is future oriented and not suicidal        Behavioral Health Treatment Plan [facility]: Diagnosis and Treatment Plan explained to Aydin Quintana relates understanding diagnosis and is agreeable to Treatment Plan   Yes     Visit start and stop times:    11/14/22  Start Time: 1205  Stop Time: 1250  Total Visit Time: 45 minutes

## 2023-04-06 ENCOUNTER — OFFICE VISIT (OUTPATIENT)
Dept: URGENT CARE | Facility: CLINIC | Age: 33
End: 2023-04-06

## 2023-04-06 VITALS
SYSTOLIC BLOOD PRESSURE: 140 MMHG | RESPIRATION RATE: 20 BRPM | BODY MASS INDEX: 21.15 KG/M2 | HEART RATE: 81 BPM | DIASTOLIC BLOOD PRESSURE: 79 MMHG | WEIGHT: 182.8 LBS | OXYGEN SATURATION: 99 % | HEIGHT: 78 IN | TEMPERATURE: 100 F

## 2023-04-06 DIAGNOSIS — H92.02 LEFT EAR PAIN: Primary | ICD-10-CM

## 2023-04-06 RX ORDER — PREDNISONE 10 MG/1
30 TABLET ORAL DAILY
Qty: 15 TABLET | Refills: 0 | Status: SHIPPED | OUTPATIENT
Start: 2023-04-06 | End: 2023-04-11

## 2023-04-06 RX ORDER — FLUTICASONE PROPIONATE 50 MCG
1 SPRAY, SUSPENSION (ML) NASAL DAILY
Qty: 16 G | Refills: 0 | Status: SHIPPED | OUTPATIENT
Start: 2023-04-06

## 2023-04-06 NOTE — PATIENT INSTRUCTIONS
Eustachian Tube Dysfunction   AMBULATORY CARE:   Eustachian tube dysfunction (ETD)  is a condition that prevents your eustachian tubes from opening properly  It can also cause them to become blocked  Eustachian tubes connect your middle ear to the back of your nose and throat  These tubes open and allow air to flow in and out when you sneeze, swallow, or yawn  Common signs and symptoms include the following:   Fullness or pressure in your ears    Muffled hearing, or a feeling you are hearing under water or have clogged ears    Pain in one or both ears    Ringing in your ears    Popping, crackling, or clicking feeling in your ears    Trouble keeping your balance    Call your doctor or otolaryngologist if:   Your symptoms do not improve or get worse  You have a fever  You have any hearing loss  You have questions or concerns about your condition or care  Treatment:  ETD may get better on its own without any treatment  If it continues, you may need any of the following:  Swallow, yawn, or chew gum  to help open your eustachian tubes  Your healthcare provider may also recommend you blow with your mouth shut and your nostrils pinched closed  Air pressure devices  push air into your nose and eustachian tubes to help relieve air pressure in your ear  Treatment for allergies  such as decongestants, antihistamines, and nasal steroids may improve ETD  They may help decrease swelling of the eustachian tubes  A myringotomy  is surgery to make a hole in your eardrum  The hole relieves pressure and lets fluid drain from your ear  A pressure equalizing (PE) tube may be used to keep the hole open and to help drain fluid  Tuboplasty  is a procedure to widen your eustachian tubes  Follow up with your doctor or otolaryngologist as directed:  Write down your questions so you remember to ask them during your visits    © Copyright Mak Sanches 2022 Information is for End User's use only and may not be sold, redistributed or otherwise used for commercial purposes  The above information is an  only  It is not intended as medical advice for individual conditions or treatments  Talk to your doctor, nurse or pharmacist before following any medical regimen to see if it is safe and effective for you

## 2023-04-06 NOTE — PROGRESS NOTES
[facility]        NAME: Claudell Reaper is a 33 y o  male  : 1990    MRN: [id number]  DATE: 2023  TIME: 5:12 PM    Assessment and Plan   Left ear pain [H92 02]  1  Left ear pain  predniSONE 10 mg tablet    fluticasone (FLONASE) 50 mcg/act nasal spray            Patient Instructions   Patient Instructions     Eustachian Tube Dysfunction   AMBULATORY CARE:   Eustachian tube dysfunction (ETD)  is a condition that prevents your eustachian tubes from opening properly  It can also cause them to become blocked  Eustachian tubes connect your middle ear to the back of your nose and throat  These tubes open and allow air to flow in and out when you sneeze, swallow, or yawn  Common signs and symptoms include the following:   • Fullness or pressure in your ears    • Muffled hearing, or a feeling you are hearing under water or have clogged ears    • Pain in one or both ears    • Ringing in your ears    • Popping, crackling, or clicking feeling in your ears    • Trouble keeping your balance    Call your doctor or otolaryngologist if:   • Your symptoms do not improve or get worse  • You have a fever  • You have any hearing loss  • You have questions or concerns about your condition or care  Treatment:  ETD may get better on its own without any treatment  If it continues, you may need any of the following:  • Swallow, yawn, or chew gum  to help open your eustachian tubes  Your healthcare provider may also recommend you blow with your mouth shut and your nostrils pinched closed  • Air pressure devices  push air into your nose and eustachian tubes to help relieve air pressure in your ear  • Treatment for allergies  such as decongestants, antihistamines, and nasal steroids may improve ETD  They may help decrease swelling of the eustachian tubes  • A myringotomy  is surgery to make a hole in your eardrum  The hole relieves pressure and lets fluid drain from your ear   A pressure equalizing (PE) tube may be used to keep the hole open and to help drain fluid  • Tuboplasty  is a procedure to widen your eustachian tubes  Follow up with your doctor or otolaryngologist as directed:  Write down your questions so you remember to ask them during your visits  © Copyright Chris Loja 2022 Information is for End User's use only and may not be sold, redistributed or otherwise used for commercial purposes  The above information is an  only  It is not intended as medical advice for individual conditions or treatments  Talk to your doctor, nurse or pharmacist before following any medical regimen to see if it is safe and effective for you  Follow up with PCP in 3-5 days  Proceed to  ER if symptoms worsen  Chief Complaint     Chief Complaint   Patient presents with   • Earache     Patient presents with left ear discomfort on and off since July flared up about an hour ago  History of Present Illness       The pt is a 28-year-old male presenting for L ear pain since July  The pain flared up and hour ago which prompted him to come in  Review of Systems   Review of Systems   Constitutional: Negative for activity change, appetite change, chills, diaphoresis and fever  HENT: Positive for ear pain  Negative for congestion, rhinorrhea and sore throat  Eyes: Negative for pain and visual disturbance  Respiratory: Negative for cough, chest tightness and shortness of breath  Cardiovascular: Negative for chest pain and palpitations  Gastrointestinal: Negative for abdominal pain, diarrhea, nausea and vomiting  Genitourinary: Negative for dysuria and hematuria  Musculoskeletal: Negative for arthralgias, back pain and myalgias  Skin: Negative for color change, pallor and rash  Neurological: Negative for seizures, syncope and headaches  All other systems reviewed and are negative          Current Medications       Current Outpatient "Medications:   •  fluticasone (FLONASE) 50 mcg/act nasal spray, 1 spray into each nostril daily, Disp: 16 g, Rfl: 0  •  predniSONE 10 mg tablet, Take 3 tablets (30 mg total) by mouth daily for 5 days, Disp: 15 tablet, Rfl: 0    Current Allergies     Allergies as of 04/06/2023   • (No Known Allergies)            The following portions of the patient's history were reviewed and updated as appropriate: allergies, current medications, past family history, past medical history, past social history, past surgical history and problem list      History reviewed  No pertinent past medical history  History reviewed  No pertinent surgical history  No family history on file  Medications have been verified  Objective   /79   Pulse 81   Temp 100 °F (37 8 °C) (Tympanic)   Resp 20   Ht 6' 6\" (1 981 m)   Wt 82 9 kg (182 lb 12 8 oz)   SpO2 99%   BMI 21 12 kg/m²        Physical Exam     Physical Exam  Vitals and nursing note reviewed  Constitutional:       General: He is not in acute distress  Appearance: Normal appearance  He is normal weight  He is not ill-appearing, toxic-appearing or diaphoretic  HENT:      Head: Normocephalic and atraumatic  Right Ear: Ear canal and external ear normal  There is no impacted cerumen  Left Ear: Ear canal and external ear normal  There is no impacted cerumen  Ears:      Comments: Fluid bubbles behind b/l TM       Nose: Nose normal  No congestion or rhinorrhea  Mouth/Throat:      Mouth: Mucous membranes are moist       Pharynx: Oropharynx is clear  No oropharyngeal exudate or posterior oropharyngeal erythema  Cardiovascular:      Rate and Rhythm: Normal rate and regular rhythm  Heart sounds: Normal heart sounds  No murmur heard  No friction rub  No gallop  Pulmonary:      Effort: Pulmonary effort is normal  No respiratory distress  Breath sounds: Normal breath sounds  No stridor  No wheezing, rhonchi or rales     Chest:      " Chest wall: No tenderness  Abdominal:      General: Abdomen is flat  Bowel sounds are normal  There is no distension  Palpations: Abdomen is soft  Tenderness: There is no abdominal tenderness  There is no guarding  Musculoskeletal:         General: Normal range of motion  Cervical back: Normal range of motion  Lymphadenopathy:      Cervical: No cervical adenopathy  Skin:     General: Skin is warm and dry  Capillary Refill: Capillary refill takes less than 2 seconds  Neurological:      Mental Status: He is alert

## 2023-04-20 PROBLEM — F41.1 GAD (GENERALIZED ANXIETY DISORDER): Status: ACTIVE | Noted: 2023-04-20

## 2023-04-20 PROBLEM — F51.05 INSOMNIA DUE TO OTHER MENTAL DISORDER: Status: ACTIVE | Noted: 2023-04-20

## 2023-04-20 PROBLEM — H69.83 DYSFUNCTION OF BOTH EUSTACHIAN TUBES: Status: ACTIVE | Noted: 2023-04-20

## 2023-04-20 PROBLEM — F99 INSOMNIA DUE TO OTHER MENTAL DISORDER: Status: ACTIVE | Noted: 2023-04-20

## 2023-04-20 PROBLEM — H69.93 DYSFUNCTION OF BOTH EUSTACHIAN TUBES: Status: ACTIVE | Noted: 2023-04-20

## 2023-04-20 PROBLEM — M25.861 MASS OF JOINT OF RIGHT KNEE: Status: ACTIVE | Noted: 2023-04-20

## 2023-04-25 ENCOUNTER — TELEPHONE (OUTPATIENT)
Dept: FAMILY MEDICINE CLINIC | Facility: CLINIC | Age: 33
End: 2023-04-25

## 2023-04-25 NOTE — TELEPHONE ENCOUNTER
Please make patient aware that Strattera was not covered by his insurance  Can his insurance please be contacted so that we can find out what type of nonstimulant ADHD medication is covered by his insurance?

## 2023-04-27 NOTE — TELEPHONE ENCOUNTER
Lmom for pt to contact insurance and let us know which med would be covered since Lea Arrington is not

## 2023-05-04 ENCOUNTER — TELEPHONE (OUTPATIENT)
Dept: FAMILY MEDICINE CLINIC | Facility: CLINIC | Age: 33
End: 2023-05-04

## 2023-05-04 NOTE — TELEPHONE ENCOUNTER
Tiffanie Cox ([place]: [id number])  Need help? Call us at [phone]    Status Sent to Plan today  [employer] The plan will fax you a determination, typically within 1 to 5 business days  How do I follow up?   Drug Atomoxetine HCl 40MG capsules   Form [address] for Oral and Other Pharmacy Requests for [address] [facility] Members    [phone]  [phone]

## 2023-05-10 ENCOUNTER — TELEPHONE (OUTPATIENT)
Dept: FAMILY MEDICINE CLINIC | Facility: CLINIC | Age: 33
End: 2023-05-10

## 2023-05-19 ENCOUNTER — TELEPHONE (OUTPATIENT)
Dept: PSYCHIATRY | Facility: CLINIC | Age: 33
End: 2023-05-19

## 2023-05-19 NOTE — TELEPHONE ENCOUNTER
Referral letter sent via [facility] [address] [place] [address]  If no response received within 15 days, referral will be closed

## 2023-05-25 ENCOUNTER — OFFICE VISIT (OUTPATIENT)
Dept: FAMILY MEDICINE CLINIC | Facility: CLINIC | Age: 33
End: 2023-05-25

## 2023-05-25 VITALS
DIASTOLIC BLOOD PRESSURE: 86 MMHG | HEART RATE: 84 BPM | BODY MASS INDEX: 21.32 KG/M2 | WEIGHT: 184.25 LBS | HEIGHT: 78 IN | SYSTOLIC BLOOD PRESSURE: 114 MMHG

## 2023-05-25 DIAGNOSIS — F99 INSOMNIA DUE TO OTHER MENTAL DISORDER: ICD-10-CM

## 2023-05-25 DIAGNOSIS — F41.1 GAD (GENERALIZED ANXIETY DISORDER): ICD-10-CM

## 2023-05-25 DIAGNOSIS — F90.2 ATTENTION DEFICIT HYPERACTIVITY DISORDER (ADHD), COMBINED TYPE: Primary | ICD-10-CM

## 2023-05-25 DIAGNOSIS — F51.05 INSOMNIA DUE TO OTHER MENTAL DISORDER: ICD-10-CM

## 2023-05-25 NOTE — ASSESSMENT & PLAN NOTE
Patient was advised to trial the 12 5 mg dosage of hydroxyzine to see if this helps with his anxiety and causes less drowsiness

## 2023-05-25 NOTE — ASSESSMENT & PLAN NOTE
Patient reports that his symptoms are manageable at this point and he is not currently interested in trialing another medication for his ADHD    Patient reports that he will reach out to psychiatry in the near future to be placed on the wait list

## 2023-05-25 NOTE — ASSESSMENT & PLAN NOTE
Patient was advised to trial a half of the hydroxyzine 25 mg for a total of 12 5 mg at bedtime to see if this causes less drowsiness for him in the morning

## 2023-05-25 NOTE — PROGRESS NOTES
Name: Rudi Mckeon      : 1990      MRN: [id number]  Encounter Provider: ZORAIDA Wellington  Encounter Date: 2023   Encounter department: [place] [address]     1  Attention deficit hyperactivity disorder (ADHD), combined type  Assessment & Plan:  Patient reports that his symptoms are manageable at this point and he is not currently interested in trialing another medication for his ADHD  Patient reports that he will reach out to psychiatry in the near future to be placed on the wait list       2  ALLA (generalized anxiety disorder)  Assessment & Plan:  Patient was advised to trial the 12 5 mg dosage of hydroxyzine to see if this helps with his anxiety and causes less drowsiness  3  Insomnia due to other mental disorder  Assessment & Plan:  Patient was advised to trial a half of the hydroxyzine 25 mg for a total of 12 5 mg at bedtime to see if this causes less drowsiness for him in the morning  Subjective      ADHD: At patient's last office visit he was started on Strattera daily to help with ADHD symptoms  Patient reports that he did discontinue this medication after 5 days due to side effects  Patient reports that he is still having difficulty with attention and concentration which does make it difficult for him to complete tasks while at work however, he reports that the symptoms are manageable currently  He is not interested in trialing any other medications for ADHD at this point  Patient states that he will reach out to psychiatry in the near future to be put on the wait list     ALLA/insomnia: At patient's last office visit he was started on hydroxyzine 25 mg as needed every 6 hours to help with anxiety and insomnia symptoms  Patient reports that he did take hydroxyzine once at bedtime however, he felt drowsy the next morning so he did not take the medication again    Patient reports that his anxiety symptoms have been "manageable  Review of Systems   Constitutional: Negative for chills and fever  HENT: Negative for ear pain and sore throat  Eyes: Negative for pain and visual disturbance  Respiratory: Negative for cough, chest tightness, shortness of breath and wheezing  Cardiovascular: Negative for chest pain, palpitations and leg swelling  Gastrointestinal: Negative for abdominal pain, constipation, diarrhea, nausea and vomiting  Endocrine: Negative for cold intolerance and heat intolerance  Genitourinary: Negative for decreased urine volume, dysuria and hematuria  Musculoskeletal: Negative for arthralgias, back pain and myalgias  Skin: Negative for color change and rash  Allergic/Immunologic: Negative for environmental allergies  Neurological: Negative for dizziness, seizures, syncope, weakness, light-headedness, numbness and headaches  Hematological: Negative for adenopathy  Psychiatric/Behavioral: Positive for decreased concentration and sleep disturbance (insomnia )  Negative for agitation, behavioral problems, confusion, dysphoric mood, hallucinations, self-injury and suicidal ideas  The patient is nervous/anxious (intermittent)  The patient is not hyperactive  All other systems reviewed and are negative  Current Outpatient Medications on File Prior to Visit   Medication Sig   • fluticasone (FLONASE) 50 mcg/act nasal spray 1 spray into each nostril daily   • hydrOXYzine HCL (ATARAX) 25 mg tablet Take 1 tablet (25 mg total) by mouth every 6 (six) hours as needed for anxiety (Insomnia)   • [DISCONTINUED] atoMOXetine (STRATTERA) 40 mg capsule Take 1 capsule (40 mg total) by mouth daily (Patient not taking: Reported on 5/25/2023)       Objective     /86   Pulse 84   Ht 6' 6\" (1 981 m)   Wt 83 6 kg (184 lb 4 oz)   BMI 21 29 kg/m²     Physical Exam  Vitals and nursing note reviewed  Constitutional:       General: He is not in acute distress  Appearance: Normal appearance   " He is not ill-appearing  HENT:      Head: Normocephalic  Eyes:      Conjunctiva/sclera: Conjunctivae normal    Cardiovascular:      Rate and Rhythm: Normal rate and regular rhythm  Pulses: Normal pulses  Carotid pulses are 2+ on the right side and 2+ on the left side  Radial pulses are 2+ on the right side and 2+ on the left side  Posterior tibial pulses are 2+ on the right side and 2+ on the left side  Heart sounds: Normal heart sounds  No murmur heard  Pulmonary:      Effort: Pulmonary effort is normal  No respiratory distress  Breath sounds: Normal breath sounds  No decreased breath sounds, wheezing, rhonchi or rales  Abdominal:      General: Abdomen is flat  Bowel sounds are normal  There is no distension  Palpations: Abdomen is soft  Tenderness: There is no abdominal tenderness  There is no guarding  Musculoskeletal:         General: Normal range of motion  Cervical back: Normal range of motion  Right lower leg: No edema  Left lower leg: No edema  Skin:     General: Skin is warm and dry  Capillary Refill: Capillary refill takes less than 2 seconds  Neurological:      General: No focal deficit present  Mental Status: He is alert and oriented to person, place, and time  Psychiatric:         Mood and Affect: Mood normal          Behavior: Behavior normal          Thought Content:  Thought content normal          Judgment: Judgment normal        ZORAIDA Delaney

## 2023-08-30 ENCOUNTER — APPOINTMENT (OUTPATIENT)
Dept: LAB | Facility: CLINIC | Age: 33
End: 2023-08-30
Payer: COMMERCIAL

## 2023-08-30 DIAGNOSIS — Z13.0 SCREENING FOR DEFICIENCY ANEMIA: ICD-10-CM

## 2023-08-30 DIAGNOSIS — Z13.29 SCREENING FOR THYROID DISORDER: ICD-10-CM

## 2023-08-30 DIAGNOSIS — Z13.220 SCREENING FOR LIPID DISORDERS: ICD-10-CM

## 2023-08-30 DIAGNOSIS — Z13.1 SCREENING FOR DIABETES MELLITUS: ICD-10-CM

## 2023-08-30 LAB
ALBUMIN SERPL BCP-MCNC: 4.4 G/DL (ref 3.5–5)
ALP SERPL-CCNC: 44 U/L (ref 34–104)
ALT SERPL W P-5'-P-CCNC: 4 U/L (ref 7–52)
ANION GAP SERPL CALCULATED.3IONS-SCNC: 10 MMOL/L
AST SERPL W P-5'-P-CCNC: 12 U/L (ref 13–39)
BASOPHILS # BLD AUTO: 0.02 THOUSANDS/ÂΜL (ref 0–0.1)
BASOPHILS NFR BLD AUTO: 1 % (ref 0–1)
BILIRUB SERPL-MCNC: 0.92 MG/DL (ref 0.2–1)
BILIRUB UR QL STRIP: NEGATIVE
BUN SERPL-MCNC: 12 MG/DL (ref 5–25)
CALCIUM SERPL-MCNC: 9.1 MG/DL (ref 8.4–10.2)
CHLORIDE SERPL-SCNC: 101 MMOL/L (ref 96–108)
CHOLEST SERPL-MCNC: 127 MG/DL
CLARITY UR: CLEAR
CO2 SERPL-SCNC: 26 MMOL/L (ref 21–32)
COLOR UR: NORMAL
CREAT SERPL-MCNC: 1.06 MG/DL (ref 0.6–1.3)
EOSINOPHIL # BLD AUTO: 0.04 THOUSAND/ÂΜL (ref 0–0.61)
EOSINOPHIL NFR BLD AUTO: 1 % (ref 0–6)
ERYTHROCYTE [DISTWIDTH] IN BLOOD BY AUTOMATED COUNT: 11.8 % (ref 11.6–15.1)
GFR SERPL CREATININE-BSD FRML MDRD: 93 ML/MIN/1.73SQ M
GLUCOSE P FAST SERPL-MCNC: 91 MG/DL (ref 65–99)
GLUCOSE UR STRIP-MCNC: NEGATIVE MG/DL
HCT VFR BLD AUTO: 45.9 % (ref 36.5–49.3)
HDLC SERPL-MCNC: 46 MG/DL
HGB BLD-MCNC: 15.1 G/DL (ref 12–17)
HGB UR QL STRIP.AUTO: NEGATIVE
IMM GRANULOCYTES # BLD AUTO: 0.03 THOUSAND/UL (ref 0–0.2)
IMM GRANULOCYTES NFR BLD AUTO: 1 % (ref 0–2)
KETONES UR STRIP-MCNC: NEGATIVE MG/DL
LDLC SERPL CALC-MCNC: 66 MG/DL (ref 0–100)
LEUKOCYTE ESTERASE UR QL STRIP: NEGATIVE
LYMPHOCYTES # BLD AUTO: 1.05 THOUSANDS/ÂΜL (ref 0.6–4.47)
LYMPHOCYTES NFR BLD AUTO: 32 % (ref 14–44)
MCH RBC QN AUTO: 29.4 PG (ref 26.8–34.3)
MCHC RBC AUTO-ENTMCNC: 32.9 G/DL (ref 31.4–37.4)
MCV RBC AUTO: 90 FL (ref 82–98)
MONOCYTES # BLD AUTO: 0.24 THOUSAND/ÂΜL (ref 0.17–1.22)
MONOCYTES NFR BLD AUTO: 7 % (ref 4–12)
NEUTROPHILS # BLD AUTO: 1.88 THOUSANDS/ÂΜL (ref 1.85–7.62)
NEUTS SEG NFR BLD AUTO: 58 % (ref 43–75)
NITRITE UR QL STRIP: NEGATIVE
NRBC BLD AUTO-RTO: 0 /100 WBCS
PH UR STRIP.AUTO: 6.5 [PH]
PLATELET # BLD AUTO: 203 THOUSANDS/UL (ref 149–390)
PMV BLD AUTO: 10.6 FL (ref 8.9–12.7)
POTASSIUM SERPL-SCNC: 3.8 MMOL/L (ref 3.5–5.3)
PROT SERPL-MCNC: 7 G/DL (ref 6.4–8.4)
PROT UR STRIP-MCNC: NEGATIVE MG/DL
RBC # BLD AUTO: 5.13 MILLION/UL (ref 3.88–5.62)
SODIUM SERPL-SCNC: 137 MMOL/L (ref 135–147)
SP GR UR STRIP.AUTO: 1.01 (ref 1–1.03)
TRIGL SERPL-MCNC: 73 MG/DL
TSH SERPL DL<=0.05 MIU/L-ACNC: 1.51 UIU/ML (ref 0.45–4.5)
UROBILINOGEN UR STRIP-ACNC: <2 MG/DL
WBC # BLD AUTO: 3.26 THOUSAND/UL (ref 4.31–10.16)

## 2023-08-30 PROCEDURE — 84443 ASSAY THYROID STIM HORMONE: CPT

## 2023-08-30 PROCEDURE — 85025 COMPLETE CBC W/AUTO DIFF WBC: CPT

## 2023-08-30 PROCEDURE — 80053 COMPREHEN METABOLIC PANEL: CPT

## 2023-08-30 PROCEDURE — 36415 COLL VENOUS BLD VENIPUNCTURE: CPT

## 2023-08-30 PROCEDURE — 80061 LIPID PANEL: CPT

## 2023-08-30 PROCEDURE — 81003 URINALYSIS AUTO W/O SCOPE: CPT

## 2023-09-08 ENCOUNTER — OFFICE VISIT (OUTPATIENT)
Dept: FAMILY MEDICINE CLINIC | Facility: CLINIC | Age: 33
End: 2023-09-08
Payer: COMMERCIAL

## 2023-09-08 VITALS
WEIGHT: 181.4 LBS | BODY MASS INDEX: 20.99 KG/M2 | HEART RATE: 80 BPM | OXYGEN SATURATION: 98 % | TEMPERATURE: 99 F | DIASTOLIC BLOOD PRESSURE: 70 MMHG | HEIGHT: 78 IN | SYSTOLIC BLOOD PRESSURE: 130 MMHG

## 2023-09-08 DIAGNOSIS — F90.2 ATTENTION DEFICIT HYPERACTIVITY DISORDER (ADHD), COMBINED TYPE: ICD-10-CM

## 2023-09-08 DIAGNOSIS — F99 INSOMNIA DUE TO OTHER MENTAL DISORDER: ICD-10-CM

## 2023-09-08 DIAGNOSIS — F41.1 GAD (GENERALIZED ANXIETY DISORDER): Primary | ICD-10-CM

## 2023-09-08 DIAGNOSIS — H69.83 DYSFUNCTION OF BOTH EUSTACHIAN TUBES: ICD-10-CM

## 2023-09-08 DIAGNOSIS — F51.05 INSOMNIA DUE TO OTHER MENTAL DISORDER: ICD-10-CM

## 2023-09-08 DIAGNOSIS — D72.819 LEUKOPENIA, UNSPECIFIED TYPE: ICD-10-CM

## 2023-09-08 PROCEDURE — 99214 OFFICE O/P EST MOD 30 MIN: CPT | Performed by: NURSE PRACTITIONER

## 2023-09-08 NOTE — ASSESSMENT & PLAN NOTE
Pt has discontinued the 12.5 mg dosage of hydroxyzine as he still woke up very drowsy from this medication. Patient is not interested in starting any new medication for this at this time as he is still waiting for the psych evaluation. He is planning to see if Timmy Juliann accepts his insurance.

## 2023-09-08 NOTE — ASSESSMENT & PLAN NOTE
Pt is advised to continue using Flonase 1x per day. He is also advised to buy an OTC anti-histamine to help with his symptom relief.

## 2023-09-08 NOTE — ASSESSMENT & PLAN NOTE
Pt has discontinued the 12.5 mg dosage of hydroxyzine as he still woke up very drowsy from this medication. Patient is not interested in starting any new medication for this at this time as he is still waiting for the psych evaluation. He is planning to see if Nicky Ashley accepts his insurance.

## 2023-09-08 NOTE — PROGRESS NOTES
Name: Alcides Gibbs      : 1990      MRN: [id number]  Encounter Provider: ZORAIDA Monk  Encounter Date: 2023   Encounter department: [facility] [address]     1. Leukopenia, unspecified type  -     CBC and differential; Future    2.  Dysfunction of both eustachian tubes           Subjective      HPI  Review of Systems    Current Outpatient Medications on File Prior to Visit   Medication Sig   • fluticasone (FLONASE) 50 mcg/act nasal spray 1 spray into each nostril daily   • [DISCONTINUED] hydrOXYzine HCL (ATARAX) 25 mg tablet Take 1 tablet (25 mg total) by mouth every 6 (six) hours as needed for anxiety (Insomnia) (Patient not taking: Reported on 2023)       Objective     /70 (BP Location: Left arm, Patient Position: Sitting, Cuff Size: Adult)   Pulse 80   Temp 99 °F (37.2 °C) (Tympanic)   Ht 6' 6" (1.981 m)   Wt 82.3 kg (181 lb 6.4 oz)   SpO2 98%   BMI 20.96 kg/m²     Physical Exam  ZORAIDA Monk

## 2023-09-08 NOTE — ASSESSMENT & PLAN NOTE
Patient reports that his symptoms remain manageable. He is not interested in trialing another medication for this. Still awaiting Psych evaluation and will likely try to see if Timmy Humphreys accepts his insurance.

## 2023-09-08 NOTE — PROGRESS NOTES
Name: Apoorva Fernandez      : 1990      MRN: [id number]  Encounter Provider: ZORAIDA Lyle  Encounter Date: 2023   Encounter department: [place] [address] [address] [facility]     1. ALLA (generalized anxiety disorder)  Assessment & Plan:  Pt has discontinued the 12.5 mg dosage of hydroxyzine as he still woke up very drowsy from this medication. Patient is not interested in starting any new medication for this at this time as he is still waiting for the psych evaluation. He is planning to see if Javier Carson accepts his insurance. 2. Attention deficit hyperactivity disorder (ADHD), combined type  Assessment & Plan:  Patient reports that his symptoms remain manageable. He is not interested in trialing another medication for this. Still awaiting Psych evaluation and will likely try to see if Javier Carson accepts his insurance. 3. Insomnia due to other mental disorder  Assessment & Plan:  Pt has discontinued the 12.5 mg dosage of hydroxyzine as he still woke up very drowsy from this medication. Patient is not interested in starting any new medication for this at this time as he is still waiting for the psych evaluation. He is planning to see if Javier Carson accepts his insurance. 4. Dysfunction of both eustachian tubes  Assessment & Plan:  Pt is advised to continue using Flonase 1x per day. He is also advised to buy an OTC anti-histamine to help with his symptom relief. 5. Leukopenia, unspecified type  Assessment & Plan:  Re-check CBC in 6 months. Orders:  -     CBC and differential; Future           Subjective      Anxiety and Insomnia: Pt is here today for a 3 month follow up appointment. Pt reports that he has not been taking the Atarax at night. He had tried cutting the pill in 1/2 for a few days but still woke up extremely groggy the next morning. Pt states that it definelty helped him fall and stay asleep but did not do much for his anxiety.  Pt feels comfortable and stable without the use of any medication. He states that sometimes he has an "aversion to sleeping" like his body does not want to go to bed. On average it takes him 10-30 minutes to fall asleep. When he is traveling for work it takes him much longer to sleep. He does this about 1 time per month for an average of 7 days. Pt would not like to start meds at this time and is still waiting for a Psych evaluation. Eustachian Tube Dysfunction: Pt complains of L ear pain and fullness and nasal congestion. Pt states that flonase is helping but sometimes takes a while to kick in. Pt states he is orginally from [place] so the allergens here are likely very different. Review of Systems   Constitutional: Positive for activity change. Negative for chills, fatigue and fever. Reports less exercise than usual due to working from home a lot. Will try to walk outside more in the fall   HENT: Positive for congestion and rhinorrhea. Negative for ear pain, postnasal drip, sinus pressure, sinus pain and sore throat. Eustachian tube dysfunction. Reports L ear fullness. It may be related to allergies as he is from [place].   Eyes: Negative for pain and visual disturbance. Respiratory: Negative for cough, chest tightness and shortness of breath. Cardiovascular: Negative for chest pain, palpitations and leg swelling. Gastrointestinal: Negative for abdominal pain, constipation, diarrhea, nausea and vomiting. Endocrine: Negative for cold intolerance and heat intolerance. Genitourinary: Negative for decreased urine volume, difficulty urinating, dysuria and hematuria. Musculoskeletal: Negative for arthralgias, back pain, myalgias and neck stiffness. Skin: Negative for color change and rash. Allergic/Immunologic: Positive for environmental allergies. Neurological: Negative for dizziness, seizures, syncope, light-headedness and headaches. Hematological: Negative for adenopathy. Psychiatric/Behavioral: Positive for decreased concentration. Negative for self-injury and suicidal ideas. The patient is nervous/anxious. Still seeking Psych evaluation. Has not heard back in 6 months. All other systems reviewed and are negative. Current Outpatient Medications on File Prior to Visit   Medication Sig   • fluticasone (FLONASE) 50 mcg/act nasal spray 1 spray into each nostril daily   • [DISCONTINUED] hydrOXYzine HCL (ATARAX) 25 mg tablet Take 1 tablet (25 mg total) by mouth every 6 (six) hours as needed for anxiety (Insomnia) (Patient not taking: Reported on 9/8/2023)       Objective     /70 (BP Location: Left arm, Patient Position: Sitting, Cuff Size: Adult)   Pulse 80   Temp 99 °F (37.2 °C) (Tympanic)   Ht 6' 6" (1.981 m)   Wt 82.3 kg (181 lb 6.4 oz)   SpO2 98%   BMI 20.96 kg/m²     Physical Exam  Vitals and nursing note reviewed. Constitutional:       General: He is not in acute distress. Appearance: Normal appearance. He is not ill-appearing. HENT:      Head: Normocephalic. Right Ear: Hearing normal. A middle ear effusion (small) is present. Left Ear: Hearing normal. A middle ear effusion (small) is present. Ears:      Comments: Very small amount of fluid behind bilateral TMs. Eyes:      Conjunctiva/sclera: Conjunctivae normal.   Cardiovascular:      Rate and Rhythm: Normal rate and regular rhythm. Pulses: Normal pulses. Carotid pulses are 2+ on the right side and 2+ on the left side. Radial pulses are 2+ on the right side and 2+ on the left side. Posterior tibial pulses are 2+ on the right side and 2+ on the left side. Heart sounds: Normal heart sounds. No murmur heard. Pulmonary:      Effort: Pulmonary effort is normal. No respiratory distress. Breath sounds: Normal breath sounds. No decreased breath sounds, wheezing, rhonchi or rales. Abdominal:      General: Abdomen is flat.  Bowel sounds are normal. There is no distension. Palpations: Abdomen is soft. Tenderness: There is no abdominal tenderness. There is no guarding. Musculoskeletal:         General: Normal range of motion. Cervical back: Normal range of motion. Right lower leg: No edema. Left lower leg: No edema. Skin:     General: Skin is warm and dry. Capillary Refill: Capillary refill takes less than 2 seconds. Neurological:      General: No focal deficit present. Mental Status: He is alert and oriented to person, place, and time. Psychiatric:         Mood and Affect: Mood normal.         Behavior: Behavior normal.         Thought Content:  Thought content normal.         Judgment: Judgment normal.       ZORAIDA Sosa

## 2024-01-29 ENCOUNTER — OFFICE VISIT (OUTPATIENT)
Dept: URGENT CARE | Age: 34
End: 2024-01-29
Payer: COMMERCIAL

## 2024-01-29 VITALS
SYSTOLIC BLOOD PRESSURE: 112 MMHG | DIASTOLIC BLOOD PRESSURE: 84 MMHG | WEIGHT: 180.2 LBS | OXYGEN SATURATION: 99 % | HEART RATE: 94 BPM | BODY MASS INDEX: 20.82 KG/M2 | TEMPERATURE: 100.7 F | RESPIRATION RATE: 20 BRPM

## 2024-01-29 DIAGNOSIS — J02.9 SORE THROAT: ICD-10-CM

## 2024-01-29 DIAGNOSIS — R09.81 NASAL CONGESTION: ICD-10-CM

## 2024-01-29 DIAGNOSIS — R05.1 ACUTE COUGH: Primary | ICD-10-CM

## 2024-01-29 LAB — S PYO AG THROAT QL: NEGATIVE

## 2024-01-29 PROCEDURE — 99213 OFFICE O/P EST LOW 20 MIN: CPT | Performed by: EMERGENCY MEDICINE

## 2024-01-29 PROCEDURE — 87880 STREP A ASSAY W/OPTIC: CPT

## 2024-01-29 PROCEDURE — 87636 SARSCOV2 & INF A&B AMP PRB: CPT

## 2024-01-29 RX ORDER — FLUTICASONE PROPIONATE 50 MCG
2 SPRAY, SUSPENSION (ML) NASAL DAILY
Qty: 15.8 ML | Refills: 0 | Status: SHIPPED | OUTPATIENT
Start: 2024-01-29

## 2024-01-29 NOTE — PATIENT INSTRUCTIONS
Hydration and rest.  Recommend throat lozenges, anesthetic spray such as chloraseptic, and gargling warm salt water for throat irritation.   Use flonase as directed.   Acetaminophen and ibuprofen for pain relief and fever reduction.   COVID/influenza testing.  Use the [facility] MyChart to obtain lab results.  Recommend mucinex OTC for cold symptoms.   PCP follow up in 1-2 days.   Go to an emergency department if difficulty breathing occurs or if symptoms worsen.    Recommended supplements for potential COVID-19 is the following: Vitamin D3 2000 IU  daily ,  Vitamin C 1g  every 12 hours , Multivitamin Daily

## 2024-01-29 NOTE — PROGRESS NOTES
[facility]        NAME: Sami Morelos is a 33 y.o. male  : 1990    MRN: [id number]  DATE: 2024  TIME: 11:31 AM      Assessment and Plan     Acute cough [R05.1]  1. Acute cough  Covid/Flu- Office Collect Normal      2. Nasal congestion  fluticasone (FLONASE) 50 mcg/act nasal spray      3. Sore throat  POCT rapid ANTIGEN strepA          Rapid strep negative   Patient Instructions   Hydration and rest.  Recommend throat lozenges, anesthetic spray such as chloraseptic, and gargling warm salt water for throat irritation.   Use flonase as directed.   Acetaminophen and ibuprofen for pain relief and fever reduction.   COVID/influenza testing.  Use the [facility] MyChart to obtain lab results.  Recommend mucinex OTC for cold symptoms.   PCP follow up in 1-2 days.   Go to an emergency department if difficulty breathing occurs or if symptoms worsen.    Recommended supplements for potential COVID-19 is the following: Vitamin D3 2000 IU  daily ,  Vitamin C 1g  every 12 hours , Multivitamin Daily     Chief Complaint     Chief Complaint   Patient presents with    Cold Like Symptoms     Pt started Sat with a runny nose, Sun with sinus pressure/body aches, chills, fatigue, sore throat and nasal congestion. Today started with cough. Took Advil at 6PM last jayne.  Denies sick contacts. Unsure of fevers.           History of Present Illness     Patient is a 33-year-old male who presents with cough, congestion, sore throat, sinus pain and pressure, body aches and chills for 2 days. Denies asthma history. Denies smoking history. Denies known sick contacts         Review of Systems     Review of Systems   Constitutional:  Positive for chills and fever.   HENT:  Positive for congestion, sinus pressure, sinus pain and sore throat.    Respiratory:  Positive for cough.    Gastrointestinal:  Negative for diarrhea, nausea and vomiting.   Musculoskeletal:  Positive for myalgias.   All other systems reviewed  and are negative.        Current Medications       Current Outpatient Medications:     fluticasone (FLONASE) 50 mcg/act nasal spray, 2 sprays into each nostril daily, Disp: 15.8 mL, Rfl: 0    fluticasone (FLONASE) 50 mcg/act nasal spray, 1 spray into each nostril daily (Patient not taking: Reported on 1/29/2024), Disp: 16 g, Rfl: 0    Current Allergies     Allergies as of 01/29/2024    (No Known Allergies)              The following portions of the patient's history were reviewed and updated as appropriate: allergies, current medications, past family history, past medical history, past social history, past surgical history and problem list.     History reviewed. No pertinent past medical history.    History reviewed. No pertinent surgical history.    History reviewed. No pertinent family history.      Medications have been verified.        Objective     /84   Pulse 94   Temp (!) 100.7 °F (38.2 °C) (Tympanic)   Resp 20   Wt 81.7 kg (180 lb 3.2 oz)   SpO2 99%   BMI 20.82 kg/m²   No LMP for male patient.         Physical Exam     Physical Exam  Vitals and nursing note reviewed.   Constitutional:       General: He is not in acute distress.     Appearance: Normal appearance. He is not ill-appearing, toxic-appearing or diaphoretic.   HENT:      Right Ear: Tympanic membrane, ear canal and external ear normal.      Left Ear: Tympanic membrane, ear canal and external ear normal.      Nose: Congestion present.      Mouth/Throat:      Lips: Pink.      Mouth: Mucous membranes are moist.      Pharynx: Oropharynx is clear. Uvula midline. No oropharyngeal exudate or posterior oropharyngeal erythema.   Cardiovascular:      Rate and Rhythm: Normal rate.      Pulses: Normal pulses.      Heart sounds: Normal heart sounds, S1 normal and S2 normal.   Pulmonary:      Effort: Pulmonary effort is normal.      Breath sounds: Normal breath sounds and air entry. No stridor, decreased air movement or transmitted upper airway  sounds. No decreased breath sounds, wheezing, rhonchi or rales.   Skin:     General: Skin is warm.      Capillary Refill: Capillary refill takes less than 2 seconds.   Neurological:      General: No focal deficit present.      Mental Status: He is alert.   Psychiatric:         Mood and Affect: Mood normal.         Behavior: Behavior normal.         Thought Content: Thought content normal.         Judgment: Judgment normal.

## 2024-01-29 NOTE — LETTER
[facility] [place]  [address] [address]  Dept: [phone] January 29, 2024    Patient: Sami Morelos  YOB: 1990    Sami Morelos was seen and evaluated at our [facility]. Please note if Covid and Flu tests are negative, they may return to work when fever free for 24 hours without the use of a fever reducing agent. If Covid or Flu test is positive, they may return to work on 2/1/24, as this is 5 days from the onset of symptoms. Upon return, they must then adhere to strict masking for an additional 5 days.    Sincerely,    ZORAIDA Perry

## 2024-01-30 LAB
FLUAV RNA RESP QL NAA+PROBE: NEGATIVE
FLUBV RNA RESP QL NAA+PROBE: NEGATIVE
SARS-COV-2 RNA RESP QL NAA+PROBE: NEGATIVE

## 2024-03-15 ENCOUNTER — OFFICE VISIT (OUTPATIENT)
Dept: FAMILY MEDICINE CLINIC | Facility: CLINIC | Age: 34
End: 2024-03-15
Payer: COMMERCIAL

## 2024-03-15 VITALS
DIASTOLIC BLOOD PRESSURE: 84 MMHG | HEIGHT: 78 IN | HEART RATE: 93 BPM | OXYGEN SATURATION: 99 % | WEIGHT: 180.4 LBS | BODY MASS INDEX: 20.87 KG/M2 | SYSTOLIC BLOOD PRESSURE: 110 MMHG

## 2024-03-15 DIAGNOSIS — R25.2 CRAMPING OF FEET: ICD-10-CM

## 2024-03-15 DIAGNOSIS — M25.861 MASS OF JOINT OF RIGHT KNEE: Primary | ICD-10-CM

## 2024-03-15 DIAGNOSIS — F51.05 INSOMNIA DUE TO OTHER MENTAL DISORDER: ICD-10-CM

## 2024-03-15 DIAGNOSIS — F99 INSOMNIA DUE TO OTHER MENTAL DISORDER: ICD-10-CM

## 2024-03-15 DIAGNOSIS — L21.9 SEBORRHEA: ICD-10-CM

## 2024-03-15 PROCEDURE — 99214 OFFICE O/P EST MOD 30 MIN: CPT | Performed by: NURSE PRACTITIONER

## 2024-03-15 RX ORDER — METHOCARBAMOL 500 MG/1
500 TABLET, FILM COATED ORAL 3 TIMES DAILY PRN
Qty: 30 TABLET | Refills: 0 | Status: SHIPPED | OUTPATIENT
Start: 2024-03-15

## 2024-03-15 RX ORDER — TRAZODONE HYDROCHLORIDE 50 MG/1
50 TABLET ORAL
Qty: 30 TABLET | Refills: 1 | Status: SHIPPED | OUTPATIENT
Start: 2024-03-15

## 2024-03-15 NOTE — ASSESSMENT & PLAN NOTE
CMP, magnesium, and phosphorus were ordered to assess for electrolyte abnormalities which could be contributing to his recurrent cramps.  Robaxin 500 mg was ordered to be used up to 3 times per day as needed for the cramps.

## 2024-03-15 NOTE — ASSESSMENT & PLAN NOTE
Patient was advised to continue using over-the-counter antifungal shampoo as needed.  He was advised that if this is not completely controlling his symptoms we can trial ketoconazole shampoo in the future.

## 2024-03-15 NOTE — PROGRESS NOTES
Name: Sami Morelos      : 1990      MRN: [id number]  Encounter Provider: ZORAIDA López  Encounter Date: 3/15/2024   Encounter department: [facility]    Assessment & Plan     1. Mass of joint of right knee  Assessment & Plan:  Ultrasound of the knee was reordered to further characterize noted mass.    Orders:  -     US extremity soft tissue; Future; Expected date: 03/15/2024    2. Cramping of feet  Assessment & Plan:  CMP, magnesium, and phosphorus were ordered to assess for electrolyte abnormalities which could be contributing to his recurrent cramps.  Robaxin 500 mg was ordered to be used up to 3 times per day as needed for the cramps.    Orders:  -     Comprehensive metabolic panel; Future  -     Magnesium; Future  -     Phosphorus; Future  -     methocarbamol (ROBAXIN) 500 mg tablet; Take 1 tablet (500 mg total) by mouth 3 (three) times a day as needed for muscle spasms    3. Insomnia due to other mental disorder  Assessment & Plan:  Patient will be trialed on trazodone 50 mg at bedtime as needed to help with his insomnia symptoms.    Orders:  -     traZODone (DESYREL) 50 mg tablet; Take 1 tablet (50 mg total) by mouth daily at bedtime as needed for sleep    4. Seborrhea  Assessment & Plan:  Patient was advised to continue using over-the-counter antifungal shampoo as needed.  He was advised that if this is not completely controlling his symptoms we can trial ketoconazole shampoo in the future.           Subjective      Right knee mass: Patient had previously reported noting a mass of his right knee.  The mass was previously noted to be small and round.  An ultrasound of the knee was ordered previously to further assess the area but this was never completed.  The patient reports that the area does become inflamed at times and he does feel like the mass slightly increases in size but will then return back to its original size.  He does report some tenderness with  palpation of the area.    Cramping of feet: Patient reports over about the past 6 months he has been noting recurrent cramping of his bilateral feet.  He reports that these episodes seem to occur randomly and he most often notes them when putting his socks on or if he stretches his feet a certain way.  He reports that the episodes will often last 10 to 15 minutes and then resolve on their own but the episodes do tend to be painful for him.  He does state that he frequently drinks water throughout the day.    Insomnia: Patient has been experiencing chronic insomnia for some time.  Patient was previously trialed on hydroxyzine 25 mg at bedtime as needed however, he reported that the medication was causing too much drowsiness when waking in the morning so the medication was discontinued.  He reports that recently his insomnia has seemed to worsen as he is often now not able to fall asleep until 4 or 5 in the morning.  He has trialed melatonin and Benadryl in the past with very limited improvement of his symptoms.  He is interested in trialing another prescription medication for his insomnia at this point.    Seborrhea: Patient reports that recently he has been noting dry, flaky skin on his scalp.  He reports that recently he has been using an over-the-counter antifungal shampoo which has significantly improved his symptoms.          Review of Systems   Constitutional:  Negative for chills and fever.   HENT:  Negative for ear pain and sore throat.    Eyes:  Negative for pain and visual disturbance.   Respiratory:  Negative for cough, chest tightness, shortness of breath and wheezing.    Cardiovascular:  Negative for chest pain, palpitations and leg swelling.   Gastrointestinal:  Negative for abdominal pain, constipation, diarrhea, nausea and vomiting.   Endocrine: Negative for cold intolerance and heat intolerance.   Genitourinary:  Negative for decreased urine volume, dysuria and hematuria.   Musculoskeletal:  Negative  "for arthralgias, back pain and myalgias.        Recurrent cramping of bilateral feet   Skin:  Negative for color change and rash.   Allergic/Immunologic: Negative for environmental allergies.   Neurological:  Negative for dizziness, seizures, syncope, weakness, light-headedness, numbness and headaches.   Hematological:  Negative for adenopathy.   Psychiatric/Behavioral:  Positive for sleep disturbance (insomnia). Negative for confusion, dysphoric mood, self-injury and suicidal ideas. The patient is not nervous/anxious.    All other systems reviewed and are negative.      Current Outpatient Medications on File Prior to Visit   Medication Sig   • [DISCONTINUED] fluticasone (FLONASE) 50 mcg/act nasal spray 1 spray into each nostril daily (Patient not taking: Reported on 1/29/2024)   • [DISCONTINUED] fluticasone (FLONASE) 50 mcg/act nasal spray 2 sprays into each nostril daily (Patient not taking: Reported on 3/15/2024)       Objective     /84 (BP Location: Right arm, Patient Position: Sitting, Cuff Size: Adult)   Pulse 93   Ht 6' 6\" (1.981 m)   Wt 81.8 kg (180 lb 6.4 oz)   SpO2 99%   BMI 20.85 kg/m²     Physical Exam  Vitals and nursing note reviewed.   Constitutional:       General: He is not in acute distress.     Appearance: Normal appearance. He is not ill-appearing.   HENT:      Head: Normocephalic.   Eyes:      Conjunctiva/sclera: Conjunctivae normal.   Cardiovascular:      Rate and Rhythm: Normal rate and regular rhythm.      Pulses: Normal pulses.           Carotid pulses are 2+ on the right side and 2+ on the left side.       Radial pulses are 2+ on the right side and 2+ on the left side.        Posterior tibial pulses are 2+ on the right side and 2+ on the left side.      Heart sounds: Normal heart sounds. No murmur heard.  Pulmonary:      Effort: Pulmonary effort is normal. No respiratory distress.      Breath sounds: Normal breath sounds. No decreased breath sounds, wheezing, rhonchi or rales. "   Abdominal:      General: Abdomen is flat. Bowel sounds are normal. There is no distension.      Palpations: Abdomen is soft.      Tenderness: There is no abdominal tenderness. There is no guarding.   Musculoskeletal:         General: Normal range of motion.      Cervical back: Normal range of motion.      Right lower leg: No edema.      Left lower leg: No edema.   Skin:     General: Skin is warm and dry.      Capillary Refill: Capillary refill takes less than 2 seconds.             Comments: Small, round, fixed, slightly tender mass noted on the anterior portion of the patient's knee.   Neurological:      General: No focal deficit present.      Mental Status: He is alert and oriented to person, place, and time.   Psychiatric:         Mood and Affect: Mood normal.         Behavior: Behavior normal.         Thought Content: Thought content normal.         Judgment: Judgment normal.       ZORAIDA López

## 2024-03-15 NOTE — ASSESSMENT & PLAN NOTE
Patient will be trialed on trazodone 50 mg at bedtime as needed to help with his insomnia symptoms.

## 2024-03-29 ENCOUNTER — HOSPITAL ENCOUNTER (OUTPATIENT)
Dept: ULTRASOUND IMAGING | Facility: HOSPITAL | Age: 34
Discharge: HOME | End: 2024-03-29
Payer: COMMERCIAL

## 2024-03-29 DIAGNOSIS — M25.861 MASS OF JOINT OF RIGHT KNEE: ICD-10-CM

## 2024-03-29 PROCEDURE — 76882 US LMTD JT/FCL EVL NVASC XTR: CPT

## 2024-04-01 DIAGNOSIS — M25.861 CYST OF RIGHT KNEE JOINT: Primary | ICD-10-CM

## 2024-04-10 ENCOUNTER — OFFICE VISIT (OUTPATIENT)
Dept: OBGYN CLINIC | Facility: MEDICAL CENTER | Age: 34
End: 2024-04-10
Payer: COMMERCIAL

## 2024-04-10 VITALS
HEART RATE: 90 BPM | DIASTOLIC BLOOD PRESSURE: 77 MMHG | WEIGHT: 180 LBS | SYSTOLIC BLOOD PRESSURE: 114 MMHG | HEIGHT: 78 IN | BODY MASS INDEX: 20.83 KG/M2

## 2024-04-10 DIAGNOSIS — M25.561 RIGHT KNEE PAIN, UNSPECIFIED CHRONICITY: Primary | ICD-10-CM

## 2024-04-10 PROCEDURE — 99203 OFFICE O/P NEW LOW 30 MIN: CPT | Performed by: ORTHOPAEDIC SURGERY

## 2024-04-10 NOTE — PROGRESS NOTES
Ortho Sports Medicine Knee New Patient Visit     Assesment:   33 y.o. male right knee ganglion cyst vs prepatellar bursal cyst. This has been ongoing for the patient for 3-4 years and painful for the patient. He opted to have it aspirated under ultrasound guidance. He will follow up in 6-8 weeks, may consider MRI if the symptoms persist despite aspiration.    Plan:      Referral for [facility] US guided aspiration of R knee cyst placed     Conservative treatment:    Ice to knee for 20 minutes at least 1-2 times daily.  OTC NSAIDS prn for pain.    Imaging:    All imaging from today was reviewed by myself and explained to the patient.       Injection:    No Injection planned at this time.      Surgery:     No surgery is recommended at this point, continue with conservative treatment plan as noted.      Follow up:    Return in about 8 weeks (around 6/5/2024).        Chief Complaint   Patient presents with    Right Knee - Pain       History of Present Illness:    The patient is a 33 y.o. male whose occupation is unknown, referred to me by their primary care physician, seen in clinic for consultation of right knee pain.      Pain is located anterior.  The patient rates the pain as a 3/10.  The pain has been present for 3 years.      The patient denies any injury. The pain is characterized as sharp and achy.  The pain is present intermittently. .      Pain is improved by rest and ice.  Pain is aggravated by weight bearing.    Symptoms include swelling.     The patient has tried rest, ice, and NSAIDS.          Knee Surgical History:  None    Past Medical, Social and Family History:  History reviewed. No pertinent past medical history.  History reviewed. No pertinent surgical history.  No Known Allergies  Current Outpatient Medications on File Prior to Visit   Medication Sig Dispense Refill    methocarbamol (ROBAXIN) 500 mg tablet Take 1 tablet (500 mg total) by mouth 3 (three) times a day as needed for muscle spasms 30 tablet 0  "   traZODone (DESYREL) 50 mg tablet Take 1 tablet (50 mg total) by mouth daily at bedtime as needed for sleep 30 tablet 1     No current facility-administered medications on file prior to visit.     Social History     Socioeconomic History    Marital status: Single     Spouse name: Not on file    Number of children: Not on file    Years of education: Not on file    Highest education level: Not on file   Occupational History    Not on file   Tobacco Use    Smoking status: Never     Passive exposure: Never    Smokeless tobacco: Never   Vaping Use    Vaping status: Never Used   Substance and Sexual Activity    Alcohol use: Yes     Comment: socially    Drug use: Yes     Types: Marijuana    Sexual activity: Yes   Other Topics Concern    Not on file   Social History Narrative    Not on file     Social Determinants of Health     Financial Resource Strain: Not on file   Food Insecurity: Not on file   Transportation Needs: Not on file   Physical Activity: Not on file   Stress: Not on file   Social Connections: Not on file   Intimate Partner Violence: Not on file   Housing Stability: Not on file         I have reviewed the past medical, surgical, social and family history, medications and allergies as documented in the EMR.    Review of systems: ROS is negative other than that noted in the HPI.  Constitutional: Negative for fatigue and fever.   HENT: Negative for sore throat.    Respiratory: Negative for shortness of breath.    Cardiovascular: Negative for chest pain.   Gastrointestinal: Negative for abdominal pain.   Endocrine: Negative for cold intolerance and heat intolerance.   Genitourinary: Negative for flank pain.   Musculoskeletal: Negative for back pain.   Skin: Negative for rash.   Allergic/Immunologic: Negative for immunocompromised state.   Neurological: Negative for dizziness.   Psychiatric/Behavioral: Negative for agitation.      Physical Exam:    Blood pressure 114/77, pulse 90, height 6' 6\" (1.981 m), weight " 81.6 kg (180 lb).    General/Constitutional: NAD, well developed, well nourished  HENT: Normocephalic, atraumatic  CV: Intact distal pulses, regular rate  Resp: No respiratory distress or labored breathing  GI: Soft and non-tender   Lymphatic: No lymphadenopathy palpated  Neuro: Alert and Oriented x 3, no focal deficits  Psych: Normal mood, normal affect, normal judgement, normal behavior  Skin: Warm, dry, no rashes, no erythema      Knee Exam (focused):                RIGHT    ROM:   0-130    Palpation: Effusion minimal      MJL tenderness Negative      LJL tenderness Negative    Meniscus: Jamari Negative     Apley's Compression Negative    Instability: Varus stable      Valgus stable    Special Tests: Lachman Negative      Posterior drawer Negative      Anterior drawer Negative      Pivot shift not tested      Dial not tested    Patella: Palpation negative      Mobility 1/4      Apprehension Negative    Other: Single leg 1/4 squat not tested       LE NV Exam: +2 DP/PT pulses bilaterally  Sensation intact to light touch L2-S1 bilaterally     Bilateral hip ROM demonstrates no pain actively or passively    No calf tenderness to palpation bilaterally    Knee Imaging    US Right knee reviewed shows 1.1 x 0.3 x 0.9 cm cyst       Scribe Attestation      I,:   am acting as a scribe while in the presence of the attending physician.:       I,:   personally performed the services described in this documentation    as scribed in my presence.:

## 2024-04-18 NOTE — NURSING NOTE
Call placed to patient to discuss upcoming appointment at [facility] radiology department and complete consultation with patient. Patient is having a right knee aspiration utilizing US guidance. Reviewed patient's allergies, no current anticoagulant medication present per patient, also discussed the pre and post procedure expectations. Reminded patient of location and time expected for procedure, Patient expressed understanding by verbalizing and repeating instructions.

## 2024-04-26 ENCOUNTER — HOSPITAL ENCOUNTER (OUTPATIENT)
Dept: RADIOLOGY | Facility: HOSPITAL | Age: 34
Discharge: HOME | End: 2024-04-26
Payer: COMMERCIAL

## 2024-04-26 DIAGNOSIS — M25.561 RIGHT KNEE PAIN, UNSPECIFIED CHRONICITY: ICD-10-CM

## 2024-04-26 PROCEDURE — 20611 DRAIN/INJ JOINT/BURSA W/US: CPT

## 2024-04-26 RX ORDER — LIDOCAINE HYDROCHLORIDE 10 MG/ML
5 INJECTION, SOLUTION EPIDURAL; INFILTRATION; INTRACAUDAL; PERINEURAL ONCE
Status: COMPLETED | OUTPATIENT
Start: 2024-04-26 | End: 2024-04-26

## 2024-04-26 RX ADMIN — LIDOCAINE HYDROCHLORIDE 1 ML: 10 INJECTION, SOLUTION EPIDURAL; INFILTRATION; INTRACAUDAL; PERINEURAL at 10:00

## 2024-05-20 ENCOUNTER — OFFICE VISIT (OUTPATIENT)
Dept: OBGYN CLINIC | Facility: MEDICAL CENTER | Age: 34
End: 2024-05-20
Payer: COMMERCIAL

## 2024-05-20 VITALS
HEIGHT: 78 IN | HEART RATE: 86 BPM | SYSTOLIC BLOOD PRESSURE: 140 MMHG | BODY MASS INDEX: 21.06 KG/M2 | DIASTOLIC BLOOD PRESSURE: 84 MMHG | WEIGHT: 182 LBS

## 2024-05-20 DIAGNOSIS — M25.561 RIGHT KNEE PAIN, UNSPECIFIED CHRONICITY: Primary | ICD-10-CM

## 2024-05-20 PROCEDURE — 99213 OFFICE O/P EST LOW 20 MIN: CPT | Performed by: ORTHOPAEDIC SURGERY

## 2024-05-20 NOTE — PROGRESS NOTES
Ortho Sports Medicine Knee New Patient Visit     Assesment:   33 y.o. male right knee ganglion cyst vs prepatellar bursal cyst.     Plan:    Unfortunately, it was difficult for radiology to do the ultrasound-guided aspiration and very little fluid was removed.  Discussed that the small mass can be resected surgically, but MRI of the right knee would need to be done first to give more characteristics of the cyst.  At this time patient would like to hold off on any further treatment since it is not giving him much discomfort.  If he changes his mind, advised patient to contact the office and we will order the MRI of the right knee.    Conservative treatment:    Ice to knee for 20 minutes at least 1-2 times daily.  OTC NSAIDS prn for pain.    Imaging:    All imaging from today was reviewed by myself and explained to the patient.       Injection:    No Injection planned at this time.      Surgery:     No surgery is recommended at this point, continue with conservative treatment plan as noted.      Follow up:    No follow-ups on file.        Chief Complaint   Patient presents with    Right Knee - Follow-up       History of Present Illness:    The patient is a 33 y.o. male following up for a right knee ganglion cyst vs prepatella bursa cyst.  He had the ultrasound-guided aspiration of the cyst on April 26, 2024.  He states he had difficulty isolating the cyst with the procedure.  There were only able to aspirate a small amount of blood.  This continues to be the same size and gives him mild discomfort. It can be tender to touch at times.  He denies any burning sensation or hypersensitivity over it.        Knee Surgical History:  None    Past Medical, Social and Family History:  History reviewed. No pertinent past medical history.  History reviewed. No pertinent surgical history.  No Known Allergies  Current Outpatient Medications on File Prior to Visit   Medication Sig Dispense Refill    traZODone (DESYREL) 50 mg tablet  Take 1 tablet (50 mg total) by mouth daily at bedtime as needed for sleep 30 tablet 1    methocarbamol (ROBAXIN) 500 mg tablet Take 1 tablet (500 mg total) by mouth 3 (three) times a day as needed for muscle spasms (Patient not taking: Reported on 5/20/2024) 30 tablet 0     No current facility-administered medications on file prior to visit.     Social History     Socioeconomic History    Marital status: /Civil Union     Spouse name: Not on file    Number of children: Not on file    Years of education: Not on file    Highest education level: Not on file   Occupational History    Not on file   Tobacco Use    Smoking status: Never     Passive exposure: Never    Smokeless tobacco: Never   Vaping Use    Vaping status: Never Used   Substance and Sexual Activity    Alcohol use: Yes     Comment: socially    Drug use: Yes     Types: Marijuana    Sexual activity: Yes   Other Topics Concern    Not on file   Social History Narrative    Not on file     Social Determinants of Health     Financial Resource Strain: Not on file   Food Insecurity: Not on file   Transportation Needs: Not on file   Physical Activity: Not on file   Stress: Not on file   Social Connections: Not on file   Intimate Partner Violence: Not on file   Housing Stability: Not on file         I have reviewed the past medical, surgical, social and family history, medications and allergies as documented in the EMR.    Review of systems: ROS is negative other than that noted in the HPI.  Constitutional: Negative for fatigue and fever.   HENT: Negative for sore throat.    Respiratory: Negative for shortness of breath.    Cardiovascular: Negative for chest pain.   Gastrointestinal: Negative for abdominal pain.   Endocrine: Negative for cold intolerance and heat intolerance.   Genitourinary: Negative for flank pain.   Musculoskeletal: Negative for back pain.   Skin: Negative for rash.   Allergic/Immunologic: Negative for immunocompromised state.   Neurological:  "Negative for dizziness.   Psychiatric/Behavioral: Negative for agitation.      Physical Exam:    Blood pressure 140/84, pulse 86, height 6' 6\" (1.981 m), weight 82.6 kg (182 lb).    General/Constitutional: NAD, well developed, well nourished  HENT: Normocephalic, atraumatic  CV: Intact distal pulses, regular rate  Resp: No respiratory distress or labored breathing  GI: Soft and non-tender   Lymphatic: No lymphadenopathy palpated  Neuro: Alert and Oriented x 3, no focal deficits  Psych: Normal mood, normal affect, normal judgement, normal behavior  Skin: Warm, dry, no rashes, no erythema      Knee Exam (focused):                RIGHT    ROM:   0-130    Palpation: Effusion minimal      MJL tenderness Negative      LJL tenderness Negative    Meniscus: Jamari Negative     Apley's Compression Negative    Instability: Varus stable      Valgus stable    Special Tests: Lachman Negative      Posterior drawer Negative      Anterior drawer Negative      Pivot shift not tested      Dial not tested    Patella: Palpation negative      Mobility 1/4      Apprehension Negative    Other: Single leg 1/4 squat not tested       LE NV Exam: +2 DP/PT pulses bilaterally  Sensation intact to light touch L2-S1 bilaterally     Bilateral hip ROM demonstrates no pain actively or passively    No calf tenderness to palpation bilaterally    Well circumscribed soft mass over anterior mid patella. Mild tenderness to palpation. No erythema or warmth. Mass becomes more prominent when patient stands up.     Knee Imaging    US Right knee reviewed shows 1.1 x 0.3 x 0.9 cm cyst       Scribe Attestation      I,:  Hillary Madera PA-C am acting as a scribe while in the presence of the attending physician.:       I,:  Jonatan Calvillo DO personally performed the services described in this documentation    as scribed in my presence.:             "

## 2024-06-28 ENCOUNTER — OFFICE VISIT (OUTPATIENT)
Dept: FAMILY MEDICINE CLINIC | Facility: CLINIC | Age: 34
End: 2024-06-28
Payer: COMMERCIAL

## 2024-06-28 VITALS
HEART RATE: 100 BPM | SYSTOLIC BLOOD PRESSURE: 130 MMHG | OXYGEN SATURATION: 99 % | BODY MASS INDEX: 20.66 KG/M2 | WEIGHT: 178.6 LBS | TEMPERATURE: 97.6 F | HEIGHT: 78 IN | DIASTOLIC BLOOD PRESSURE: 80 MMHG

## 2024-06-28 DIAGNOSIS — M25.861 CYST OF RIGHT KNEE JOINT: Primary | ICD-10-CM

## 2024-06-28 DIAGNOSIS — R25.2 CRAMPING OF FEET: ICD-10-CM

## 2024-06-28 PROCEDURE — 99214 OFFICE O/P EST MOD 30 MIN: CPT | Performed by: NURSE PRACTITIONER

## 2024-06-28 NOTE — PROGRESS NOTES
Ambulatory Visit  Name: Sami Morelos      : 1990      MRN: [id number]  Encounter Provider: ZORAIDA Casillas  Encounter Date: 2024   Encounter department: [facility]    Assessment & Plan   1. Cyst of right knee joint  Assessment & Plan:  Patient reports that his symptoms are currently manageable.  I did advise him that if the cyst would increase in size or begins causing knee pain we could always have him see the orthopedic specialist again in the future.  2. Cramping of feet  Assessment & Plan:  Patient does have lab work ordered to assess for electrolyte abnormalities which could be causing his cramping.  If lab work is normal and the pain continues I will have the patient complete an x-ray to assess for any osseous abnormalities.       History of Present Illness   {Disappearing Hyperlinks I Encounters * My Last Note * Since Last Visit * History :59070}  Cyst of right knee joint: Patient has had follow-up with orthopedics regarding this and he did have an ultrasound procedure in which a small amount of fluid was aspirated from the area.  Per review of orthopedic's most recent note they did offer to surgically remove the cyst however, the patient refused at that time.  The patient reports that his knee pain is very minimal at this time.    Cramping of feet: At patient's last office visit he was reporting recurrent episodes of cramping of his bilateral feet.  Lab work was ordered to assess for any electrolyte abnormalities which could be contributing to the symptoms but this has not been completed yet.  Patient was also prescribed Robaxin to be used as needed for the cramping however, he is no longer taking this medication as he did not feel it was improving his symptoms.  He does report that he continues to note intermittent episodes of cramping in his feet as well as pain localized to the base of the right second and third toes.  He denies any associated erythema  "or warmth to touch.            Review of Systems   Constitutional:  Negative for chills and fever.   HENT:  Negative for ear pain and sore throat.    Eyes:  Negative for pain and visual disturbance.   Respiratory:  Negative for cough, chest tightness, shortness of breath and wheezing.    Cardiovascular:  Negative for chest pain, palpitations and leg swelling.   Gastrointestinal:  Negative for abdominal pain, constipation, diarrhea, nausea and vomiting.   Endocrine: Negative for cold intolerance and heat intolerance.   Genitourinary:  Negative for decreased urine volume, dysuria and hematuria.   Musculoskeletal:  Negative for arthralgias, back pain and myalgias.   Skin:  Negative for color change and rash.   Allergic/Immunologic: Negative for environmental allergies.   Neurological:  Negative for dizziness, seizures, syncope, weakness, light-headedness, numbness and headaches.   Hematological:  Negative for adenopathy.   Psychiatric/Behavioral:  Negative for confusion. The patient is not nervous/anxious.    All other systems reviewed and are negative.      Objective   {Disappearing Hyperlinks   Review Vitals * Enter New Vitals * Results Review * Labs * Imaging * Cardiology * Procedures * Lung Cancer Screening :08404}  /80 (BP Location: Left arm, Patient Position: Sitting, Cuff Size: Adult)   Pulse 100   Temp 97.6 °F (36.4 °C) (Tympanic)   Ht 6' 6\" (1.981 m)   Wt 81 kg (178 lb 9.6 oz)   SpO2 99%   BMI 20.64 kg/m²     Physical Exam  Vitals and nursing note reviewed.   Constitutional:       General: He is not in acute distress.     Appearance: Normal appearance. He is well-developed. He is not ill-appearing.   HENT:      Head: Normocephalic.   Eyes:      Conjunctiva/sclera: Conjunctivae normal.   Cardiovascular:      Rate and Rhythm: Normal rate and regular rhythm.      Pulses: Normal pulses.           Carotid pulses are 2+ on the right side and 2+ on the left side.       Radial pulses are 2+ on the right " side and 2+ on the left side.        Posterior tibial pulses are 2+ on the right side and 2+ on the left side.      Heart sounds: Normal heart sounds. No murmur heard.  Pulmonary:      Effort: Pulmonary effort is normal. No respiratory distress.      Breath sounds: Normal breath sounds. No decreased breath sounds, wheezing, rhonchi or rales.   Abdominal:      General: Abdomen is flat. Bowel sounds are normal. There is no distension.      Palpations: Abdomen is soft.      Tenderness: There is no abdominal tenderness. There is no guarding.   Musculoskeletal:         General: No swelling. Normal range of motion.      Cervical back: Normal range of motion and neck supple.      Right lower leg: No edema.      Left lower leg: No edema.   Feet:      Comments: Patient did report pain at the base of the right second and third toes when moving them.  No erythema, warmth to touch, edema, or other abnormalities were noted in this area.  Skin:     General: Skin is warm and dry.      Capillary Refill: Capillary refill takes less than 2 seconds.   Neurological:      General: No focal deficit present.      Mental Status: He is alert and oriented to person, place, and time.   Psychiatric:         Mood and Affect: Mood normal.         Behavior: Behavior normal.         Thought Content: Thought content normal.         Judgment: Judgment normal.       Administrative Statements {Disappearing Hyperlinks I  [facility]}

## 2024-06-28 NOTE — ASSESSMENT & PLAN NOTE
Patient does have lab work ordered to assess for electrolyte abnormalities which could be causing his cramping.  If lab work is normal and the pain continues I will have the patient complete an x-ray to assess for any osseous abnormalities.

## 2024-06-28 NOTE — ASSESSMENT & PLAN NOTE
Patient reports that his symptoms are currently manageable.  I did advise him that if the cyst would increase in size or begins causing knee pain we could always have him see the orthopedic specialist again in the future.

## 2025-02-11 ENCOUNTER — OFFICE VISIT (OUTPATIENT)
Dept: FAMILY MEDICINE CLINIC | Facility: CLINIC | Age: 35
End: 2025-02-11
Payer: COMMERCIAL

## 2025-02-11 VITALS
DIASTOLIC BLOOD PRESSURE: 82 MMHG | BODY MASS INDEX: 20.02 KG/M2 | OXYGEN SATURATION: 100 % | RESPIRATION RATE: 18 BRPM | HEART RATE: 90 BPM | HEIGHT: 78 IN | WEIGHT: 173 LBS | SYSTOLIC BLOOD PRESSURE: 122 MMHG

## 2025-02-11 DIAGNOSIS — F51.05 INSOMNIA DUE TO OTHER MENTAL DISORDER: Primary | ICD-10-CM

## 2025-02-11 DIAGNOSIS — L30.8 OTHER ECZEMA: ICD-10-CM

## 2025-02-11 DIAGNOSIS — F99 INSOMNIA DUE TO OTHER MENTAL DISORDER: Primary | ICD-10-CM

## 2025-02-11 PROCEDURE — 99214 OFFICE O/P EST MOD 30 MIN: CPT | Performed by: NURSE PRACTITIONER

## 2025-02-11 RX ORDER — ZOLPIDEM TARTRATE 5 MG/1
5 TABLET ORAL
Qty: 30 TABLET | Refills: 0 | Status: SHIPPED | OUTPATIENT
Start: 2025-02-11

## 2025-02-11 NOTE — PROGRESS NOTES
Name: Sami Morelos      : 1990      MRN: [id number]  Encounter Provider: ZORAIDA Casillas  Encounter Date: 2025   Encounter department: [facility]  :  Assessment & Plan  Insomnia due to other mental disorder  Patient will be trialed on Ambien 5 mg at bedtime as needed for treatment of insomnia.  I will reassess patient's symptoms at his next office visit.  Patient was also provided with information so that he can be scheduled with a provider to discuss possible use of medical marijuana for treatment of his insomnia.  Orders:  •  zolpidem (AMBIEN) 5 mg tablet; Take 1 tablet (5 mg total) by mouth daily at bedtime as needed for sleep    Other eczema  Patient denies any current symptoms related to this.              History of Present Illness   Insomnia: Patient was previously started on trazodone 50 mg at bedtime as needed for treatment of insomnia however, he recently discontinued this medication as he did not feel it was improving his symptoms.  He is interested in trialing a different medication for his insomnia.    Eczema: Patient denies any current symptoms related to this.      Review of Systems   Constitutional:  Negative for chills and fever.   HENT:  Negative for ear pain and sore throat.    Eyes:  Negative for pain and visual disturbance.   Respiratory:  Negative for cough, chest tightness, shortness of breath and wheezing.    Cardiovascular:  Negative for chest pain, palpitations and leg swelling.   Gastrointestinal:  Negative for abdominal pain, constipation, diarrhea, nausea and vomiting.   Endocrine: Negative for cold intolerance and heat intolerance.   Genitourinary:  Negative for decreased urine volume, dysuria and hematuria.   Musculoskeletal:  Negative for arthralgias, back pain and myalgias.   Skin:  Negative for color change and rash.   Allergic/Immunologic: Negative for environmental allergies.   Neurological:  Negative for dizziness, seizures,  "syncope, weakness, light-headedness, numbness and headaches.   Hematological:  Negative for adenopathy.   Psychiatric/Behavioral:  Positive for sleep disturbance (insomnia). Negative for confusion. The patient is not nervous/anxious.    All other systems reviewed and are negative.      Objective   /82 (BP Location: Left arm, Patient Position: Sitting, Cuff Size: Standard)   Pulse 90   Resp 18   Ht 6' 6\" (1.981 m)   Wt 78.5 kg (173 lb)   SpO2 100%   BMI 19.99 kg/m²      Physical Exam  Vitals and nursing note reviewed.   Constitutional:       General: He is not in acute distress.     Appearance: Normal appearance. He is well-developed. He is not ill-appearing.   HENT:      Head: Normocephalic.   Eyes:      Conjunctiva/sclera: Conjunctivae normal.   Cardiovascular:      Rate and Rhythm: Normal rate and regular rhythm.      Pulses: Normal pulses.           Carotid pulses are 2+ on the right side and 2+ on the left side.       Radial pulses are 2+ on the right side and 2+ on the left side.        Posterior tibial pulses are 2+ on the right side and 2+ on the left side.      Heart sounds: Normal heart sounds. No murmur heard.  Pulmonary:      Effort: Pulmonary effort is normal. No respiratory distress.      Breath sounds: Normal breath sounds. No decreased breath sounds, wheezing, rhonchi or rales.   Abdominal:      General: Abdomen is flat. Bowel sounds are normal. There is no distension.      Palpations: Abdomen is soft.      Tenderness: There is no abdominal tenderness. There is no guarding.   Musculoskeletal:         General: No swelling. Normal range of motion.      Cervical back: Normal range of motion and neck supple.      Right lower leg: No edema.      Left lower leg: No edema.   Skin:     General: Skin is warm and dry.      Capillary Refill: Capillary refill takes less than 2 seconds.   Neurological:      General: No focal deficit present.      Mental Status: He is alert and oriented to person, " place, and time.   Psychiatric:         Mood and Affect: Mood normal.         Behavior: Behavior normal.         Thought Content: Thought content normal.         Judgment: Judgment normal.

## 2025-02-11 NOTE — ASSESSMENT & PLAN NOTE
Patient will be trialed on Ambien 5 mg at bedtime as needed for treatment of insomnia.  I will reassess patient's symptoms at his next office visit.  Patient was also provided with information so that he can be scheduled with a provider to discuss possible use of medical marijuana for treatment of his insomnia.  Orders:  •  zolpidem (AMBIEN) 5 mg tablet; Take 1 tablet (5 mg total) by mouth daily at bedtime as needed for sleep

## 2025-03-31 DIAGNOSIS — F51.05 INSOMNIA DUE TO OTHER MENTAL DISORDER: ICD-10-CM

## 2025-03-31 DIAGNOSIS — F99 INSOMNIA DUE TO OTHER MENTAL DISORDER: ICD-10-CM

## 2025-04-01 RX ORDER — ZOLPIDEM TARTRATE 5 MG/1
5 TABLET ORAL
Qty: 30 TABLET | Refills: 0 | Status: SHIPPED | OUTPATIENT
Start: 2025-04-01

## 2025-05-01 ENCOUNTER — TELEPHONE (OUTPATIENT)
Age: 35
End: 2025-05-01

## 2025-05-01 NOTE — TELEPHONE ENCOUNTER
Last visit 02/11/2025  Next appointment 05/05/2025    Patient is requesting a tuberculosis test due to new employment. Patient already had an appointment for a 3 month follow-up, but moved it up to 05/05 and included an annual physical. Patient wants to make sure he can get the tuberculosis test the same day. Please advise.

## 2025-05-01 NOTE — TELEPHONE ENCOUNTER
Patient notified  we can do the PPD test on office visiton the 05/05/25 and had no further questions.

## 2025-05-05 ENCOUNTER — OFFICE VISIT (OUTPATIENT)
Dept: FAMILY MEDICINE CLINIC | Facility: CLINIC | Age: 35
End: 2025-05-05
Payer: COMMERCIAL

## 2025-05-05 VITALS
SYSTOLIC BLOOD PRESSURE: 126 MMHG | HEIGHT: 78 IN | HEART RATE: 87 BPM | BODY MASS INDEX: 19.41 KG/M2 | OXYGEN SATURATION: 99 % | WEIGHT: 167.8 LBS | DIASTOLIC BLOOD PRESSURE: 80 MMHG

## 2025-05-05 DIAGNOSIS — Z11.1 SCREENING FOR TUBERCULOSIS: ICD-10-CM

## 2025-05-05 DIAGNOSIS — Z00.00 ANNUAL PHYSICAL EXAM: Primary | ICD-10-CM

## 2025-05-05 DIAGNOSIS — Z13.1 SCREENING FOR DIABETES MELLITUS: ICD-10-CM

## 2025-05-05 DIAGNOSIS — F51.05 INSOMNIA DUE TO OTHER MENTAL DISORDER: ICD-10-CM

## 2025-05-05 DIAGNOSIS — Z13.0 SCREENING FOR DEFICIENCY ANEMIA: ICD-10-CM

## 2025-05-05 DIAGNOSIS — F99 INSOMNIA DUE TO OTHER MENTAL DISORDER: ICD-10-CM

## 2025-05-05 DIAGNOSIS — Z13.220 SCREENING FOR LIPID DISORDERS: ICD-10-CM

## 2025-05-05 PROCEDURE — 86580 TB INTRADERMAL TEST: CPT | Performed by: NURSE PRACTITIONER

## 2025-05-05 PROCEDURE — 99213 OFFICE O/P EST LOW 20 MIN: CPT | Performed by: NURSE PRACTITIONER

## 2025-05-05 PROCEDURE — 99395 PREV VISIT EST AGE 18-39: CPT | Performed by: NURSE PRACTITIONER

## 2025-05-05 NOTE — PROGRESS NOTES
Adult Annual Physical  Name: Sami Morelos      : 1990      MRN: [id number]  Encounter Provider: ZORAIDA Casillas  Encounter Date: 2025   Encounter department: [facility]    :  Assessment & Plan  Annual physical exam         Insomnia due to other mental disorder  Well-controlled with as needed use of Ambien.  Patient will be maintained on current dosage of Ambien.       Screening for tuberculosis    Orders:  •  TB Skin Test    Screening for deficiency anemia    Orders:  •  CBC and differential; Future    Screening for diabetes mellitus    Orders:  •  Comprehensive metabolic panel; Future  •  UA w Reflex to Microscopic w Reflex to Culture; Future    Screening for lipid disorders    Orders:  •  Lipid panel; Future        Preventive Screenings:  - Diabetes Screening: risks/benefits discussed and orders placed  - Cholesterol Screening: risks/benefits discussed and orders placed   - Hepatitis C screening: patient declines   - HIV screening: patient declines   - Colon cancer screening: screening not indicated   - Lung cancer screening: screening not indicated   - Prostate cancer screening: screening not indicated     Immunizations:  - Immunizations due: Tdap    Counseling/Anticipatory Guidance:  - Alcohol: discussed moderation in alcohol intake and recommendations for healthy alcohol use.   - Drug use: discussed harms of illicit drug use and how it can negatively impact mental/physical health.   - Tobacco use: discussed harms of tobacco use and management options for quitting.   - Dental health: discussed importance of regular tooth brushing, flossing, and dental visits.   - Sexual health: discussed sexually transmitted diseases, partner selection, use of condoms, avoidance of unintended pregnancy, and contraceptive alternatives.   - Diet: discussed recommendations for a healthy/well-balanced diet.   - Exercise: the importance of regular exercise/physical activity was  discussed. Recommend exercise 3-5 times per week for at least 30 minutes.   - Injury prevention: discussed safety/seat belts, safety helmets, smoke detectors, carbon monoxide detectors, and smoking near bedding or upholstery.      Insomnia: Patient was previously started on Ambien 5 mg at bedtime for treatment of his insomnia.  He reports that the medication has improved his symptoms significantly.    History of Present Illness     Adult Annual Physical:  Patient presents for annual physical.     Diet and Physical Activity:  - Diet/Nutrition: no special diet.  - Exercise: less than 30 minutes on average.    Depression Screening:  - PHQ-2 Score: 1    General Health:  - Sleep: 4-6 hours of sleep on average.  - Hearing: normal hearing left ear and decreased hearing right ear.  - Vision: wears glasses.  - Dental: regular dental visits.     Health:  - History of STDs: yes.   - Urinary symptoms: none.     Advanced Care Planning:  - Has an advanced directive?: no    - Has a durable medical POA?: no    - ACP document given to patient?: no      Review of Systems   Constitutional:  Negative for chills and fever.   HENT:  Negative for ear pain and sore throat.    Eyes:  Negative for pain and visual disturbance.   Respiratory:  Negative for cough, chest tightness, shortness of breath and wheezing.    Cardiovascular:  Negative for chest pain, palpitations and leg swelling.   Gastrointestinal:  Negative for abdominal pain, constipation, diarrhea, nausea and vomiting.   Endocrine: Negative for cold intolerance and heat intolerance.   Genitourinary:  Negative for decreased urine volume, dysuria and hematuria.   Musculoskeletal:  Negative for arthralgias, back pain and myalgias.   Skin:  Negative for color change and rash.   Allergic/Immunologic: Negative for environmental allergies.   Neurological:  Negative for dizziness, seizures, syncope, weakness, light-headedness, numbness and headaches.   Hematological:  Negative for  "adenopathy.   Psychiatric/Behavioral:  Positive for sleep disturbance (improved). Negative for agitation, behavioral problems, confusion, decreased concentration, dysphoric mood, hallucinations, self-injury and suicidal ideas. The patient is not nervous/anxious and is not hyperactive.    All other systems reviewed and are negative.        Objective   /80 (BP Location: Left arm, Patient Position: Sitting, Cuff Size: Adult)   Pulse 87   Ht 6' 6\" (1.981 m)   Wt 76.1 kg (167 lb 12.8 oz)   SpO2 99%   BMI 19.39 kg/m²     Physical Exam  Vitals and nursing note reviewed.   Constitutional:       General: He is not in acute distress.     Appearance: Normal appearance. He is well-developed. He is not ill-appearing.   HENT:      Head: Normocephalic.   Eyes:      Conjunctiva/sclera: Conjunctivae normal.   Cardiovascular:      Rate and Rhythm: Normal rate and regular rhythm.      Pulses: Normal pulses.           Carotid pulses are 2+ on the right side and 2+ on the left side.       Radial pulses are 2+ on the right side and 2+ on the left side.        Posterior tibial pulses are 2+ on the right side and 2+ on the left side.      Heart sounds: Normal heart sounds. No murmur heard.  Pulmonary:      Effort: Pulmonary effort is normal. No respiratory distress.      Breath sounds: Normal breath sounds. No decreased breath sounds, wheezing, rhonchi or rales.   Abdominal:      General: Abdomen is flat. Bowel sounds are normal. There is no distension.      Palpations: Abdomen is soft.      Tenderness: There is no abdominal tenderness. There is no guarding.   Musculoskeletal:         General: No swelling. Normal range of motion.      Cervical back: Normal range of motion and neck supple.      Right lower leg: No edema.      Left lower leg: No edema.   Skin:     General: Skin is warm and dry.      Capillary Refill: Capillary refill takes less than 2 seconds.   Neurological:      General: No focal deficit present.      Mental " Status: He is alert and oriented to person, place, and time.   Psychiatric:         Mood and Affect: Mood normal.         Behavior: Behavior normal.         Thought Content: Thought content normal.         Judgment: Judgment normal.

## 2025-05-05 NOTE — ASSESSMENT & PLAN NOTE
Well-controlled with as needed use of Ambien.  Patient will be maintained on current dosage of Ambien.

## 2025-05-07 ENCOUNTER — APPOINTMENT (OUTPATIENT)
Dept: LAB | Facility: CLINIC | Age: 35
End: 2025-05-07
Payer: COMMERCIAL

## 2025-05-07 ENCOUNTER — CLINICAL SUPPORT (OUTPATIENT)
Dept: FAMILY MEDICINE CLINIC | Facility: CLINIC | Age: 35
End: 2025-05-07

## 2025-05-07 DIAGNOSIS — Z13.0 SCREENING FOR DEFICIENCY ANEMIA: ICD-10-CM

## 2025-05-07 DIAGNOSIS — Z11.1 ENCOUNTER FOR PPD SKIN TEST READING: Primary | ICD-10-CM

## 2025-05-07 DIAGNOSIS — Z13.220 SCREENING FOR LIPID DISORDERS: ICD-10-CM

## 2025-05-07 DIAGNOSIS — Z13.1 SCREENING FOR DIABETES MELLITUS: ICD-10-CM

## 2025-05-07 LAB
ALBUMIN SERPL BCG-MCNC: 4.7 G/DL (ref 3.5–5)
ALP SERPL-CCNC: 40 U/L (ref 34–104)
ALT SERPL W P-5'-P-CCNC: 8 U/L (ref 7–52)
ANION GAP SERPL CALCULATED.3IONS-SCNC: 10 MMOL/L (ref 4–13)
AST SERPL W P-5'-P-CCNC: 14 U/L (ref 13–39)
BACTERIA UR QL AUTO: ABNORMAL /HPF
BASOPHILS # BLD AUTO: 0.03 THOUSANDS/ÂΜL (ref 0–0.1)
BASOPHILS NFR BLD AUTO: 1 % (ref 0–1)
BILIRUB SERPL-MCNC: 1.56 MG/DL (ref 0.2–1)
BILIRUB UR QL STRIP: NEGATIVE
BUN SERPL-MCNC: 16 MG/DL (ref 5–25)
CALCIUM SERPL-MCNC: 9.6 MG/DL (ref 8.4–10.2)
CHLORIDE SERPL-SCNC: 104 MMOL/L (ref 96–108)
CHOLEST SERPL-MCNC: 132 MG/DL (ref ?–200)
CLARITY UR: CLEAR
CO2 SERPL-SCNC: 27 MMOL/L (ref 21–32)
COLOR UR: YELLOW
CREAT SERPL-MCNC: 1.05 MG/DL (ref 0.6–1.3)
EOSINOPHIL # BLD AUTO: 0.07 THOUSAND/ÂΜL (ref 0–0.61)
EOSINOPHIL NFR BLD AUTO: 2 % (ref 0–6)
ERYTHROCYTE [DISTWIDTH] IN BLOOD BY AUTOMATED COUNT: 11.9 % (ref 11.6–15.1)
GFR SERPL CREATININE-BSD FRML MDRD: 92 ML/MIN/1.73SQ M
GLUCOSE P FAST SERPL-MCNC: 82 MG/DL (ref 65–99)
GLUCOSE UR STRIP-MCNC: NEGATIVE MG/DL
HCT VFR BLD AUTO: 44.2 % (ref 36.5–49.3)
HDLC SERPL-MCNC: 49 MG/DL
HGB BLD-MCNC: 15.2 G/DL (ref 12–17)
HGB UR QL STRIP.AUTO: NEGATIVE
IMM GRANULOCYTES # BLD AUTO: 0 THOUSAND/UL (ref 0–0.2)
IMM GRANULOCYTES NFR BLD AUTO: 0 % (ref 0–2)
INDURATION: 0 MM
KETONES UR STRIP-MCNC: NEGATIVE MG/DL
LDLC SERPL CALC-MCNC: 69 MG/DL (ref 0–100)
LEUKOCYTE ESTERASE UR QL STRIP: ABNORMAL
LYMPHOCYTES # BLD AUTO: 1.16 THOUSANDS/ÂΜL (ref 0.6–4.47)
LYMPHOCYTES NFR BLD AUTO: 36 % (ref 14–44)
MCH RBC QN AUTO: 29.6 PG (ref 26.8–34.3)
MCHC RBC AUTO-ENTMCNC: 34.4 G/DL (ref 31.4–37.4)
MCV RBC AUTO: 86 FL (ref 82–98)
MONOCYTES # BLD AUTO: 0.28 THOUSAND/ÂΜL (ref 0.17–1.22)
MONOCYTES NFR BLD AUTO: 9 % (ref 4–12)
MUCOUS THREADS UR QL AUTO: ABNORMAL
NEUTROPHILS # BLD AUTO: 1.72 THOUSANDS/ÂΜL (ref 1.85–7.62)
NEUTS SEG NFR BLD AUTO: 52 % (ref 43–75)
NITRITE UR QL STRIP: NEGATIVE
NON-SQ EPI CELLS URNS QL MICRO: ABNORMAL /HPF
NONHDLC SERPL-MCNC: 83 MG/DL
NRBC BLD AUTO-RTO: 0 /100 WBCS
PH UR STRIP.AUTO: 6.5 [PH]
PLATELET # BLD AUTO: 220 THOUSANDS/UL (ref 149–390)
PMV BLD AUTO: 10.4 FL (ref 8.9–12.7)
POTASSIUM SERPL-SCNC: 3.8 MMOL/L (ref 3.5–5.3)
PROT SERPL-MCNC: 7.2 G/DL (ref 6.4–8.4)
PROT UR STRIP-MCNC: ABNORMAL MG/DL
RBC # BLD AUTO: 5.14 MILLION/UL (ref 3.88–5.62)
RBC #/AREA URNS AUTO: ABNORMAL /HPF
SODIUM SERPL-SCNC: 141 MMOL/L (ref 135–147)
SP GR UR STRIP.AUTO: 1.03 (ref 1–1.03)
TB SKIN TEST: NEGATIVE
TRIGL SERPL-MCNC: 71 MG/DL (ref ?–150)
UROBILINOGEN UR STRIP-ACNC: 2 MG/DL
WBC # BLD AUTO: 3.26 THOUSAND/UL (ref 4.31–10.16)
WBC #/AREA URNS AUTO: ABNORMAL /HPF

## 2025-05-07 PROCEDURE — 80061 LIPID PANEL: CPT

## 2025-05-07 PROCEDURE — 36415 COLL VENOUS BLD VENIPUNCTURE: CPT

## 2025-05-07 PROCEDURE — 85025 COMPLETE CBC W/AUTO DIFF WBC: CPT

## 2025-05-07 PROCEDURE — 80053 COMPREHEN METABOLIC PANEL: CPT

## 2025-05-07 PROCEDURE — 87086 URINE CULTURE/COLONY COUNT: CPT

## 2025-05-07 PROCEDURE — 81001 URINALYSIS AUTO W/SCOPE: CPT

## 2025-05-07 NOTE — PROGRESS NOTES
PPD Reading Note  PPD read and results entered in [facility].  Result: 0 mm induration.  Interpretation: Negative  If test not read within 48-72 hours of initial placement, patient advised to repeat in other arm 1-3 weeks after this test.  Allergic reaction: no

## 2025-05-09 ENCOUNTER — RESULTS FOLLOW-UP (OUTPATIENT)
Dept: FAMILY MEDICINE CLINIC | Facility: CLINIC | Age: 35
End: 2025-05-09

## 2025-05-09 DIAGNOSIS — D72.819 LEUKOPENIA, UNSPECIFIED TYPE: Primary | ICD-10-CM

## 2025-05-09 LAB — BACTERIA UR CULT: NORMAL

## 2025-06-06 DIAGNOSIS — F99 INSOMNIA DUE TO OTHER MENTAL DISORDER: ICD-10-CM

## 2025-06-06 DIAGNOSIS — F51.05 INSOMNIA DUE TO OTHER MENTAL DISORDER: ICD-10-CM

## 2025-06-06 RX ORDER — ZOLPIDEM TARTRATE 5 MG/1
5 TABLET ORAL
Qty: 30 TABLET | Refills: 0 | Status: SHIPPED | OUTPATIENT
Start: 2025-06-06

## 2025-06-06 NOTE — TELEPHONE ENCOUNTER
Requested Prescriptions     Pending Prescriptions Disp Refills    zolpidem (AMBIEN) 5 mg tablet 30 tablet 0     Sig: Take 1 tablet (5 mg total) by mouth daily at bedtime as needed for sleep

## 2025-06-13 ENCOUNTER — OFFICE VISIT (OUTPATIENT)
Dept: FAMILY MEDICINE CLINIC | Facility: CLINIC | Age: 35
End: 2025-06-13
Payer: COMMERCIAL

## 2025-06-13 VITALS
HEART RATE: 74 BPM | HEIGHT: 78 IN | OXYGEN SATURATION: 100 % | BODY MASS INDEX: 19.32 KG/M2 | TEMPERATURE: 96.8 F | SYSTOLIC BLOOD PRESSURE: 98 MMHG | DIASTOLIC BLOOD PRESSURE: 66 MMHG | RESPIRATION RATE: 18 BRPM | WEIGHT: 167 LBS

## 2025-06-13 DIAGNOSIS — R31.0 GROSS HEMATURIA: Primary | ICD-10-CM

## 2025-06-13 DIAGNOSIS — F51.05 INSOMNIA DUE TO OTHER MENTAL DISORDER: ICD-10-CM

## 2025-06-13 DIAGNOSIS — F99 INSOMNIA DUE TO OTHER MENTAL DISORDER: ICD-10-CM

## 2025-06-13 LAB
SL AMB  POCT GLUCOSE, UA: NEGATIVE
SL AMB LEUKOCYTE ESTERASE,UA: NEGATIVE
SL AMB POCT BILIRUBIN,UA: NEGATIVE
SL AMB POCT BLOOD,UA: NEGATIVE
SL AMB POCT CLARITY,UA: NORMAL
SL AMB POCT COLOR,UA: YELLOW
SL AMB POCT KETONES,UA: NORMAL
SL AMB POCT NITRITE,UA: NEGATIVE
SL AMB POCT PH,UA: 6.5
SL AMB POCT SPECIFIC GRAVITY,UA: 1.02
SL AMB POCT URINE PROTEIN: NEGATIVE
SL AMB POCT UROBILINOGEN: 0.2

## 2025-06-13 PROCEDURE — 99214 OFFICE O/P EST MOD 30 MIN: CPT | Performed by: NURSE PRACTITIONER

## 2025-06-13 PROCEDURE — 81002 URINALYSIS NONAUTO W/O SCOPE: CPT | Performed by: NURSE PRACTITIONER

## 2025-06-13 NOTE — PROGRESS NOTES
Name: Sami Morelos      : 1990      MRN: [id number]  Encounter Provider: ZORAIDA Casillas  Encounter Date: 2025   Encounter department: [facility]  :  Assessment & Plan  Gross hematuria  I did inform the patient that I feel that the most likely cause of his episodes of hematuria was a kidney stone that he has passed at this point.  Since he has had more than 1 episode of hematuria I will have him complete an ultrasound of the kidney and bladder to assess for any kidney stones currently still in the kidney or bladder.  I did advise patient to cut back on drinking sugary drinks and to increase his water intake.  Orders:  •  POCT urine dip  •  US kidney and bladder; Future    Insomnia due to other mental disorder  Well-controlled with as needed use of Ambien.              History of Present Illness   Hematuria: Patient reported an episode of hematuria on 6/10/2025.  He reported that he noted hematuria every time he voided throughout the day and that this did extend into the next day as well.  He did also note some associated dysuria when voiding.  He reports that over the past 48 hours he has not noted any hematuria or dysuria.  The patient also reports that he did have an episode of hematuria which occurred about 6 months ago.  He denies any new sexual partners or any skin changes in the genitourinary area.  Urine dip performed in the office today showed trace ketones but was normal otherwise.    Insomnia: Well-controlled with as needed use of Ambien.    Review of Systems   Constitutional:  Negative for chills and fever.   HENT:  Negative for ear pain and sore throat.    Eyes:  Negative for pain and visual disturbance.   Respiratory:  Negative for cough, chest tightness, shortness of breath and wheezing.    Cardiovascular:  Negative for chest pain, palpitations and leg swelling.   Gastrointestinal:  Negative for abdominal pain, constipation, diarrhea, nausea and  "vomiting.   Endocrine: Negative for cold intolerance and heat intolerance.   Genitourinary:  Positive for dysuria and hematuria. Negative for decreased urine volume, difficulty urinating, enuresis, flank pain, frequency, genital sores, penile discharge, penile pain, penile swelling, scrotal swelling, testicular pain and urgency.   Musculoskeletal:  Negative for arthralgias, back pain and myalgias.   Skin:  Negative for color change and rash.   Allergic/Immunologic: Negative for environmental allergies.   Neurological:  Negative for dizziness, seizures, syncope, weakness, light-headedness, numbness and headaches.   Hematological:  Negative for adenopathy.   Psychiatric/Behavioral:  Negative for confusion. The patient is not nervous/anxious.    All other systems reviewed and are negative.      Objective   BP 98/66 (BP Location: Right arm, Patient Position: Sitting, Cuff Size: Adult)   Pulse 74   Temp (!) 96.8 °F (36 °C) (Tympanic)   Resp 18   Ht 6' 6\" (1.981 m)   Wt 75.8 kg (167 lb)   SpO2 100%   BMI 19.30 kg/m²      Physical Exam  Vitals and nursing note reviewed.   Constitutional:       General: He is not in acute distress.     Appearance: Normal appearance. He is well-developed. He is not ill-appearing.   HENT:      Head: Normocephalic.     Eyes:      Conjunctiva/sclera: Conjunctivae normal.       Cardiovascular:      Rate and Rhythm: Normal rate and regular rhythm.      Pulses: Normal pulses.           Carotid pulses are 2+ on the right side and 2+ on the left side.       Radial pulses are 2+ on the right side and 2+ on the left side.        Posterior tibial pulses are 2+ on the right side and 2+ on the left side.      Heart sounds: Normal heart sounds. No murmur heard.  Pulmonary:      Effort: Pulmonary effort is normal. No respiratory distress.      Breath sounds: Normal breath sounds. No decreased breath sounds, wheezing, rhonchi or rales.   Abdominal:      General: Abdomen is flat. There is no " distension.      Palpations: Abdomen is soft.      Tenderness: There is no abdominal tenderness. There is no right CVA tenderness, left CVA tenderness or guarding.     Musculoskeletal:         General: No swelling. Normal range of motion.      Cervical back: Normal range of motion and neck supple.      Right lower leg: No edema.      Left lower leg: No edema.     Skin:     General: Skin is warm and dry.      Capillary Refill: Capillary refill takes less than 2 seconds.     Neurological:      General: No focal deficit present.      Mental Status: He is alert and oriented to person, place, and time.     Psychiatric:         Mood and Affect: Mood normal.         Behavior: Behavior normal.         Thought Content: Thought content normal.         Judgment: Judgment normal.

## 2025-06-13 NOTE — ASSESSMENT & PLAN NOTE
I did inform the patient that I feel that the most likely cause of his episodes of hematuria was a kidney stone that he has passed at this point.  Since he has had more than 1 episode of hematuria I will have him complete an ultrasound of the kidney and bladder to assess for any kidney stones currently still in the kidney or bladder.  I did advise patient to cut back on drinking sugary drinks and to increase his water intake.  Orders:  •  POCT urine dip  •  US kidney and bladder; Future